# Patient Record
Sex: FEMALE | Race: BLACK OR AFRICAN AMERICAN | NOT HISPANIC OR LATINO | Employment: UNEMPLOYED | URBAN - METROPOLITAN AREA
[De-identification: names, ages, dates, MRNs, and addresses within clinical notes are randomized per-mention and may not be internally consistent; named-entity substitution may affect disease eponyms.]

---

## 2021-08-02 ENCOUNTER — TRANSFERRED RECORDS (OUTPATIENT)
Dept: MULTI SPECIALTY CLINIC | Facility: CLINIC | Age: 31
End: 2021-08-02

## 2021-08-02 LAB — PAP SMEAR - HIM PATIENT REPORTED: NEGATIVE

## 2022-04-29 ENCOUNTER — HOSPITAL ENCOUNTER (EMERGENCY)
Facility: CLINIC | Age: 32
Discharge: HOME | End: 2022-04-29
Payer: COMMERCIAL

## 2022-04-29 VITALS
SYSTOLIC BLOOD PRESSURE: 117 MMHG | HEART RATE: 70 BPM | WEIGHT: 178 LBS | OXYGEN SATURATION: 99 % | TEMPERATURE: 99.5 F | RESPIRATION RATE: 16 BRPM | DIASTOLIC BLOOD PRESSURE: 67 MMHG

## 2022-04-29 NOTE — ED TRIAGE NOTES
Patient took a plan B yesterday and is now having increased vaginal bleeding since two. She has gone through 4 pads and tampons today.

## 2022-12-28 ENCOUNTER — HOSPITAL ENCOUNTER (EMERGENCY)
Facility: CLINIC | Age: 32
Discharge: HOME | End: 2022-12-28
Attending: EMERGENCY MEDICINE | Admitting: EMERGENCY MEDICINE
Payer: COMMERCIAL

## 2022-12-28 VITALS
TEMPERATURE: 98 F | HEIGHT: 70 IN | HEART RATE: 78 BPM | BODY MASS INDEX: 27.35 KG/M2 | RESPIRATION RATE: 16 BRPM | DIASTOLIC BLOOD PRESSURE: 58 MMHG | OXYGEN SATURATION: 100 % | SYSTOLIC BLOOD PRESSURE: 124 MMHG | WEIGHT: 191 LBS

## 2022-12-28 DIAGNOSIS — O20.9 BLEEDING IN EARLY PREGNANCY: ICD-10-CM

## 2022-12-28 LAB
ABO/RH(D): NORMAL
HCG SERPL-ACNC: 1877 MLU/ML (ref 0–4)
HGB BLD-MCNC: 12.5 G/DL (ref 11.7–15.7)
HOLD SPECIMEN: NORMAL
SPECIMEN EXPIRATION DATE: NORMAL

## 2022-12-28 PROCEDURE — 86901 BLOOD TYPING SEROLOGIC RH(D): CPT | Performed by: EMERGENCY MEDICINE

## 2022-12-28 PROCEDURE — 36415 COLL VENOUS BLD VENIPUNCTURE: CPT | Performed by: EMERGENCY MEDICINE

## 2022-12-28 PROCEDURE — 84702 CHORIONIC GONADOTROPIN TEST: CPT | Performed by: EMERGENCY MEDICINE

## 2022-12-28 PROCEDURE — 99283 EMERGENCY DEPT VISIT LOW MDM: CPT

## 2022-12-28 PROCEDURE — 85018 HEMOGLOBIN: CPT | Performed by: EMERGENCY MEDICINE

## 2022-12-28 ASSESSMENT — ACTIVITIES OF DAILY LIVING (ADL): ADLS_ACUITY_SCORE: 33

## 2022-12-28 NOTE — ED TRIAGE NOTES
The patient presents to the ED with c/o vaginal bleeding since this morning. Had positive pregnancy test 2 days ago. LMP was last week in November. Denies any abdominal cramping or back pain.      Triage Assessment     Row Name 12/28/22 110       Triage Assessment (Adult)    Airway WDL WDL       Respiratory WDL    Respiratory WDL WDL       Skin Circulation/Temperature WDL    Skin Circulation/Temperature WDL WDL       Cardiac WDL    Cardiac WDL WDL       Peripheral/Neurovascular WDL    Peripheral Neurovascular WDL WDL       Cognitive/Neuro/Behavioral WDL    Cognitive/Neuro/Behavioral WDL WDL

## 2022-12-28 NOTE — DISCHARGE INSTRUCTIONS
Have a repeat quantitative beta-hCG performed on Friday.  It was 1877 today.  Return prior to that for for any pelvic pain or significant bleeding

## 2022-12-28 NOTE — ED PROVIDER NOTES
EMERGENCY DEPARTMENT ENCOUNTER      NAME: Khadijah Trimble  AGE: 32 year old female  YOB: 1990  MRN: [id number]  EVALUATION DATE & TIME: 12/28/2022 12:54 PM    PCP: No Ref-Primary, Physician    ED PROVIDER: Shin Austin M.D.      Chief Complaint   Patient presents with     Vaginal Bleeding - Pregnant         FINAL IMPRESSION:  Vaginal bleeding in early pregnancy      ED COURSE & MEDICAL DECISION MAKING:    Pertinent Labs & Imaging studies reviewed. (See chart for details)  32 year old female presents to the Emergency Department for evaluation of vaginal bleeding in setting of positive home pregnancy test.  Patient reports her last menstrual cycle was late November.  Has passed a couple of small clots.  No abdominal discomfort of any sort.  Patient does report prior pregnancies but reluctant to provide full history.  Blood work sent from triage area.  Quantitative hCG slightly more than 1800.  Blood type Rh+.  Hemoglobin normal.  Abdomen soft nontender.  Pelvis without tenderness.  Patient with early bleeding in pregnancy.  No signs of discomfort to suggest ectopic.  Routine return precautions given follow-up with repeat quantitative hCG in 2 days.. Patient appears non toxic with stable vitals signs. Overall exam is benign.          1:08 PM I met with the patient for the initial interview and physical examination. Discussed plan for treatment and workup in the ED.      At the conclusion of the encounter I discussed the results of all of the tests and the disposition. The questions were answered and return precautions provided. The patient or family acknowledged understanding and was agreeable with the care plan.       PPE: Provider wore gloves, N95 mask, eye protection, surgical cap.     MEDICATIONS GIVEN IN THE EMERGENCY:  Medications - No data to display    NEW PRESCRIPTIONS STARTED AT TODAY'S ER VISIT  New Prescriptions    No medications on file           =================================================================    HPI    Patient information was obtained from: Patient    Use of Intrepreter: N/A        Khadijah Trimble is a 32 year old female with no pertient medical history who presents to the ED for evaluation of vaginal bleeding. Patient states that she has passed a few small clots of blood. She recently had a positive pregnancy test at home with a positive test in the ED. Her last menstrual cycle was in late Novemeber (~1 month ago). Denies abdominal cramps. She did not state how many times she has been pregnant prior but noted that she has had pregnancy complication before. Denies history of tubal ligations. Patient just moved the [place] and does not have an OB in the area. Patient denies additional medical concerns or complaints at this time.       REVIEW OF SYSTEMS   Constitutional:  Denies fever, chills  Respiratory:  Denies productive cough or increased work of breathing  Cardiovascular:  Denies chest pain, palpitations  GI:  Denies abdominal pain (cramps), nausea, vomiting, or change in bowel or bladder habits. Endorses vaginal bleeding  Musculoskeletal:  Denies any new muscle/joint swelling  Skin:  Denies rash   Neurologic:  Denies focal weakness  All systems negative except as marked.     PAST MEDICAL HISTORY:  History reviewed. No pertinent past medical history.    PAST SURGICAL HISTORY:  History reviewed. No pertinent surgical history.      CURRENT MEDICATIONS:    No current facility-administered medications for this encounter.  No current outpatient medications on file.    ALLERGIES:  Allergies   Allergen Reactions     Bee Venom Anaphylaxis       FAMILY HISTORY:  History reviewed. No pertinent family history.    SOCIAL HISTORY:   Social History     Socioeconomic History     Marital status: Single       VITALS:  Patient Vitals for the past 24 hrs:   BP Temp Temp src Pulse Resp SpO2 Height Weight   12/28/22 1300 124/58 -- -- 78 -- 100 % --  "--   12/28/22 1105 109/56 98  F (36.7  C) Oral 70 16 99 % 1.778 m (5' 10\") 86.6 kg (191 lb)        PHYSICAL EXAM    Constitutional:  Awake, alert, in no apparent distress  HENT:  Normocephalic, Atraumatic. Bilateral external ears normal. Oropharynx moist. Nose normal. Neck- Normal range of motion   Eyes:  PERRL, EOMI with no signs of entrapment, Conjunctiva normal, No discharge.   GI:  Soft, No tenderness, No distension, No palpable masses  Musculoskeletal:   No edema. Good range of motion in all major joints.  Integument:  Warm, Dry, No erythema, No rash.   Neurologic:  Alert & oriented, Normal motor function, Normal sensory function, No focal deficits noted.   Psychiatric:  Affect normal, Judgment normal, Mood normal.     LAB:  All pertinent labs reviewed and interpreted.  Results for orders placed or performed during the hospital encounter of 12/28/22   HCG quantitative pregnancy (blood)   Result Value Ref Range    hCG Quantitative 1,877 (H) 0 - 4 mlU/mL   Result Value Ref Range    Hemoglobin 12.5 11.7 - 15.7 g/dL   ABO and Rh   Result Value Ref Range    ABO/RH(D) A POS     SPECIMEN EXPIRATION DATE [id number]        RADIOLOGY:  Reviewed all pertinent imaging. Please see official radiology report.  No orders to display         IIvy, am serving as a scribe to document services personally performed by Shin Austin MD, based on my observation and the provider's statements to me. I, Shin Austin MD attest that Ivy Kearney is acting in a scribe capacity, has observed my performance of the services and has documented them in accordance with my direction.    Shin Austin M.D.  Emergency Medicine  [facility] EMERGENCY ROOM     Shin Austin MD  12/29/22 4156    "

## 2023-05-06 ENCOUNTER — HOSPITAL ENCOUNTER (OUTPATIENT)
Facility: CLINIC | Age: 33
End: 2023-05-06
Admitting: ADVANCED PRACTICE MIDWIFE
Payer: COMMERCIAL

## 2023-05-06 ENCOUNTER — NURSE TRIAGE (OUTPATIENT)
Dept: NURSING | Facility: CLINIC | Age: 33
End: 2023-05-06
Payer: COMMERCIAL

## 2023-05-06 ENCOUNTER — HOSPITAL ENCOUNTER (OUTPATIENT)
Facility: CLINIC | Age: 33
Discharge: HOME | End: 2023-05-06
Attending: ADVANCED PRACTICE MIDWIFE | Admitting: ADVANCED PRACTICE MIDWIFE
Payer: COMMERCIAL

## 2023-05-06 ENCOUNTER — HOSPITAL ENCOUNTER (EMERGENCY)
Facility: CLINIC | Age: 33
Discharge: HOME | End: 2023-05-06
Attending: EMERGENCY MEDICINE | Admitting: EMERGENCY MEDICINE
Payer: COMMERCIAL

## 2023-05-06 VITALS
DIASTOLIC BLOOD PRESSURE: 54 MMHG | WEIGHT: 192 LBS | HEIGHT: 69 IN | HEART RATE: 62 BPM | RESPIRATION RATE: 18 BRPM | SYSTOLIC BLOOD PRESSURE: 114 MMHG | TEMPERATURE: 98.4 F | BODY MASS INDEX: 28.44 KG/M2

## 2023-05-06 VITALS
WEIGHT: 192 LBS | HEIGHT: 69 IN | BODY MASS INDEX: 28.44 KG/M2 | DIASTOLIC BLOOD PRESSURE: 52 MMHG | OXYGEN SATURATION: 99 % | RESPIRATION RATE: 18 BRPM | SYSTOLIC BLOOD PRESSURE: 109 MMHG | HEART RATE: 76 BPM | TEMPERATURE: 97.4 F

## 2023-05-06 DIAGNOSIS — O26.899 ABDOMINAL PAIN AFFECTING PREGNANCY: ICD-10-CM

## 2023-05-06 DIAGNOSIS — R10.9 ABDOMINAL PAIN AFFECTING PREGNANCY: ICD-10-CM

## 2023-05-06 DIAGNOSIS — K42.9 UMBILICAL HERNIA WITHOUT OBSTRUCTION OR GANGRENE: ICD-10-CM

## 2023-05-06 PROBLEM — Z36.89 ENCOUNTER FOR TRIAGE IN PREGNANT PATIENT: Status: ACTIVE | Noted: 2023-05-06

## 2023-05-06 LAB
ALBUMIN SERPL-MCNC: 3.1 G/DL (ref 3.5–5)
ALBUMIN UR-MCNC: NEGATIVE MG/DL
ALP SERPL-CCNC: 60 U/L (ref 45–120)
ALT SERPL W P-5'-P-CCNC: <9 U/L (ref 0–45)
ANION GAP SERPL CALCULATED.3IONS-SCNC: 7 MMOL/L (ref 5–18)
APPEARANCE UR: CLEAR
AST SERPL W P-5'-P-CCNC: 12 U/L (ref 0–40)
BASOPHILS # BLD AUTO: 0 10E3/UL (ref 0–0.2)
BASOPHILS NFR BLD AUTO: 0 %
BILIRUB SERPL-MCNC: 0.3 MG/DL (ref 0–1)
BILIRUB UR QL STRIP: NEGATIVE
BUN SERPL-MCNC: 5 MG/DL (ref 8–22)
CALCIUM SERPL-MCNC: 8.5 MG/DL (ref 8.5–10.5)
CHLORIDE BLD-SCNC: 107 MMOL/L (ref 98–107)
CO2 SERPL-SCNC: 23 MMOL/L (ref 22–31)
COLOR UR AUTO: ABNORMAL
CREAT SERPL-MCNC: 0.64 MG/DL (ref 0.6–1.1)
EOSINOPHIL # BLD AUTO: 0.3 10E3/UL (ref 0–0.7)
EOSINOPHIL NFR BLD AUTO: 4 %
ERYTHROCYTE [DISTWIDTH] IN BLOOD BY AUTOMATED COUNT: 13.2 % (ref 10–15)
GFR SERPL CREATININE-BSD FRML MDRD: >90 ML/MIN/1.73M2
GLUCOSE BLD-MCNC: 72 MG/DL (ref 70–125)
GLUCOSE UR STRIP-MCNC: NEGATIVE MG/DL
HCT VFR BLD AUTO: 33.2 % (ref 35–47)
HGB BLD-MCNC: 10.7 G/DL (ref 11.7–15.7)
HGB UR QL STRIP: NEGATIVE
IMM GRANULOCYTES # BLD: 0 10E3/UL
IMM GRANULOCYTES NFR BLD: 0 %
KETONES UR STRIP-MCNC: NEGATIVE MG/DL
LEUKOCYTE ESTERASE UR QL STRIP: ABNORMAL
LIPASE SERPL-CCNC: 43 U/L (ref 0–52)
LYMPHOCYTES # BLD AUTO: 1.9 10E3/UL (ref 0.8–5.3)
LYMPHOCYTES NFR BLD AUTO: 21 %
MCH RBC QN AUTO: 28.8 PG (ref 26.5–33)
MCHC RBC AUTO-ENTMCNC: 32.2 G/DL (ref 31.5–36.5)
MCV RBC AUTO: 90 FL (ref 78–100)
MONOCYTES # BLD AUTO: 0.8 10E3/UL (ref 0–1.3)
MONOCYTES NFR BLD AUTO: 9 %
MUCOUS THREADS #/AREA URNS LPF: PRESENT /LPF
NEUTROPHILS # BLD AUTO: 5.8 10E3/UL (ref 1.6–8.3)
NEUTROPHILS NFR BLD AUTO: 66 %
NITRATE UR QL: NEGATIVE
NRBC # BLD AUTO: 0 10E3/UL
NRBC BLD AUTO-RTO: 0 /100
PH UR STRIP: 7 [PH] (ref 5–7)
PLATELET # BLD AUTO: 269 10E3/UL (ref 150–450)
POTASSIUM BLD-SCNC: 3.7 MMOL/L (ref 3.5–5)
PROT SERPL-MCNC: 6.9 G/DL (ref 6–8)
RBC # BLD AUTO: 3.71 10E6/UL (ref 3.8–5.2)
RBC URINE: 2 /HPF
SODIUM SERPL-SCNC: 137 MMOL/L (ref 136–145)
SP GR UR STRIP: 1.02 (ref 1–1.03)
SQUAMOUS EPITHELIAL: 10 /HPF
TRANSITIONAL EPI: <1 /HPF
UROBILINOGEN UR STRIP-MCNC: 3 MG/DL
WBC # BLD AUTO: 8.9 10E3/UL (ref 4–11)
WBC URINE: 2 /HPF

## 2023-05-06 PROCEDURE — 81001 URINALYSIS AUTO W/SCOPE: CPT | Performed by: EMERGENCY MEDICINE

## 2023-05-06 PROCEDURE — 80053 COMPREHEN METABOLIC PANEL: CPT | Performed by: EMERGENCY MEDICINE

## 2023-05-06 PROCEDURE — G0463 HOSPITAL OUTPT CLINIC VISIT: HCPCS

## 2023-05-06 PROCEDURE — 36415 COLL VENOUS BLD VENIPUNCTURE: CPT | Performed by: EMERGENCY MEDICINE

## 2023-05-06 PROCEDURE — 85004 AUTOMATED DIFF WBC COUNT: CPT | Performed by: EMERGENCY MEDICINE

## 2023-05-06 PROCEDURE — 83690 ASSAY OF LIPASE: CPT | Performed by: EMERGENCY MEDICINE

## 2023-05-06 PROCEDURE — 99283 EMERGENCY DEPT VISIT LOW MDM: CPT

## 2023-05-06 RX ORDER — LIDOCAINE 40 MG/G
CREAM TOPICAL
Status: DISCONTINUED | OUTPATIENT
Start: 2023-05-06 | End: 2023-05-06 | Stop reason: HOSPADM

## 2023-05-06 ASSESSMENT — ACTIVITIES OF DAILY LIVING (ADL): ADLS_ACUITY_SCORE: 35

## 2023-05-06 NOTE — TELEPHONE ENCOUNTER
Pregnant 23 weeks.  Patient reporting umbilical hernia that is bulging, and is tender to touch, hard and pain moving around, difficult having BM.    Disposition is to ER.  Patient verbalizes understanding and agrees to plan.  She feels ok to drive herself.    Corrina Go RN  [facility]      Reason for Disposition    Hernia is painful or tender to touch    Additional Information    Negative: [1] Swelling of scrotum AND [2] has not previously been diagnosed with a hernia    Negative: SEVERE abdominal pain    Protocols used: HERNIA-A-AH

## 2023-05-07 NOTE — DISCHARGE INSTRUCTIONS
Discharge Instruction for Undelivered Patients      You were seen for:  umbilical pain  We Consulted: Joaquin Jackson CNM  You had (Test or Medicine):fetal monitoring     Diet:   Drink 8 to 12 glasses of liquids (milk, juice, water) every day.  You may eat meals and snacks.     Activity:  Count fetal kicks everyday (see handout)  Call your doctor or nurse midwife if your baby is moving less than usual.     Call your provider if you notice:  Swelling in your face or increased swelling in your hands or legs.  Headaches that are not relieved by Tylenol (acetaminophen).  Changes in your vision (blurring: seeing spots or stars.)  Nausea (sick to your stomach) and vomiting (throwing up).   Weight gain of 5 pounds or more per week.  Heartburn that doesn't go away.  Signs of bladder infection: pain when you urinate (use the toilet), need to go more often and more urgently.  The bag of rodriguez (rupture of membranes) breaks, or you notice leaking in your underwear.  Bright red blood in your underwear.  Abdominal (lower belly) or stomach pain.  For first baby: Contractions (tightening) less than 5 minutes apart for one hour or more.  Second (plus) baby: Contractions (tightening) less than 10 minutes apart and getting stronger.  *If less than 34 weeks: Contractions (tightening) more than 6 times in one hour.  Increase or change in vaginal discharge (note the color and amount)  Other: ***    Follow-up:  As scheduled in the clinic

## 2023-05-07 NOTE — DISCHARGE INSTRUCTIONS
You were seen in the emergency department for abdominal pain in mid pregnancy.  We think your evaluation is consistent with an umbilical hernia.  These can cause varying degrees of symptoms but right now we are able to easily reduce your is at the bedside.  The rest of your labs and urine testing today all looked good and we do not suspect any other kind of serious problem like kidney stone, gallbladder issue, appendicitis, etc.  It would be a good idea to get established with a general surgeon to discuss your options for managing this hernia.  Generally we like to avoid any kind of semielective surgery during pregnancy so this may need to wait until after delivery.  We would recommend Tylenol 650 mg every 6 hours for general pain relief.  If you have pain in this area we would recommend laying flat, you can apply ice packs and slow gentle pressure to see if you can reduce the hernia yourself.  If you have uncontrolled pain lasting multiple hours and unable to reduce it, we need to reevaluate in the emergency department right away.

## 2023-05-07 NOTE — PROGRESS NOTES
Data: Patient assessed in the [facility] for abdominal pain. Cervical exam deferred. Membranes intact. Contractions are not present. See flowsheets for fetal assessment documentation.     Action: Presumed adequate fetal oxygenation documented. Discharge instructions reviewed. Patient instructed to report change in fetal movement, vaginal leaking of fluid or bleeding, abdominal pain, or any concerns related to the pregnancy to provider/clinic.      Response: Orders to discharge home per Joaquin Jackson CNM and proceed to emergency department for further care. Patient verbalized understanding of education and agreement with plan. Discharged to home at 1950.

## 2023-05-07 NOTE — PROGRESS NOTES
Spoke with Joaquin Jackson CNM about pt arrival and status regarding pre-existing umbilical hernia with worsening pain. Unable to consistently trace fetal heart tones d/t to continuous fetal movement and early gestation. Okay per CNM to not obtain full 20 min strip. Vital signs WNL, no other concerning signs/symptoms at this time.     Per midwife- pt cleared from an OB standpoint and is okay to go to emergency department for further care.

## 2023-05-07 NOTE — ED PROVIDER NOTES
EMERGENCY DEPARTMENT ENCOUNTER      NAME: Khadijah Trimble  AGE: 32 year old female  YOB: 1990  MRN: [id number]  EVALUATION DATE & TIME: 2023  8:26 PM    PCP: No Ref-Primary, Physician    ED PROVIDER: Daniel James M.D.      Chief Complaint   Patient presents with     Abdominal Pain     Flank Pain         FINAL IMPRESSION:  1. Umbilical hernia without obstruction or gangrene    2. Abdominal pain affecting pregnancy          ED COURSE & MEDICAL DECISION MAKIN:11 PM I met with the patient, obtained history, performed an initial exam, and discussed options and plan for diagnostics and treatment here in the ED.   9:27 PM Patient ready for discharge.    32 year old female presents to the Emergency Department for evaluation of abdominal pain.  Patient is currently 23 weeks pregnant, has previously been known to have an abdominal umbilical hernia which was most pronounced during other previous pregnancies.  It is readily reducible on examination here and she currently reports that her pain is resolved having been more severe earlier.  It does seem to be radiating out from her abdominal hernia.  Labs were obtained as below including urine analysis with no evidence of infection or blood and labs including normal white blood cell count, stable hemoglobin, and unremarkable lipase and liver function tests.  I do not think her examination is consistent with anything like acute cholecystitis, appendicitis, or other pregnancy related complication.  She did have reassuring fetal monitoring just prior to presenting here to continue her evaluation.  With her hernia easily reducible, I think she can have a consult with a general surgeon but expect probably no immediate plans to proceed with a elective surgery while she is pregnant and this would probably have to wait until after delivery again.  We discussed strategies for manually reducing hernias at home as well as reviewing return precautions for any  severe sustained pain or nonreducible hernia.  Patient was discharged in stable condition.    At the conclusion of the encounter I discussed the results of all of the tests and the disposition. The questions were answered. The patient or family acknowledged understanding and was agreeable with the care plan.       Medical Decision Making    History:    Supplemental history from: Documented in chart, if applicable    External Record(s) reviewed: Documented in chart, if applicable.    Work Up:    Chart documentation includes differential considered and any EKGs or imaging independently interpreted by provider, where specified.    In additional to work up documented, I considered the following work up: Documented in chart, if applicable.    External consultation:    Discussion of management with another provider: Documented in chart, if applicable    Complicating factors:    Care impacted by chronic illness: N/A    Care affected by social determinants of health: N/A    Disposition considerations: Discharge. No recommendations on prescription strength medication(s). See documentation for any additional details.            MEDICATIONS GIVEN IN THE EMERGENCY:  Medications - No data to display    NEW PRESCRIPTIONS STARTED AT TODAY'S ER VISIT  There are no discharge medications for this patient.         =================================================================    HPI    Patient information was obtained from: Patient    Use of : N/A          Khadijah MARQUIS Atilio is a 32 year old female with a pertinent history of bleeding in early pregnancy who presents to this ED via walk-in with her  for evaluation of abdominal pain and flank pain.    Patient is 23 weeks pregnant. She reports she has a hernia above the umbilicus that is causing pain and radiates across to her right flank. This started 2-3 days ago. She states the hernia was regular, but now is feeling pressure from it. It was initially small, but her  "last provider told her she should consider surgery. Patient has not talked to surgery about it. Patient denies taking anything for the pain. Pain is worse when moving around during the day. Improved with laying down. Pain is currently a 3/10. No vaginal bleeding or vaginal discharge. Patient denies a nausea, vomiting, or any urinary symptoms. Patient denies any other current complaints.      REVIEW OF SYSTEMS   All systems reviewed and negative except as noted in HPI.    PAST MEDICAL HISTORY:  No past medical history on file.    PAST SURGICAL HISTORY:  No past surgical history on file.        CURRENT MEDICATIONS:    No current facility-administered medications for this encounter.     No current outpatient medications on file.         ALLERGIES:  Allergies   Allergen Reactions     Bee Venom Anaphylaxis       FAMILY HISTORY:  No family history on file.    SOCIAL HISTORY:   Social History     Socioeconomic History     Marital status: Single       VITALS:  /52   Pulse 76   Temp 97.4  F (36.3  C) (Temporal)   Resp 18   Ht 1.753 m (5' 9\")   Wt 87.1 kg (192 lb)   LMP 11/30/2022 (Within Days)   SpO2 99%   BMI 28.35 kg/m      PHYSICAL EXAM    Constitutional: Well developed, Well nourished, NAD.  HENT: Normocephalic, Atraumatic. Neck Supple.  Eyes: EOMI, Conjunctiva normal.  Respiratory: Breathing comfortably on room air. Speaks full sentences easily. Lungs clear to ascultation.  Cardiovascular: Normal heart rate, Regular rhythm. No peripheral edema.  Abdomen: Soft, gravid, nontender.  There is a umbilical hernia palpable which is easily reducible with no palpable bowel loops.  No erythema warmth or fluctuance.  Musculoskeletal: Good range of motion in all major joints. No major deformities noted.  Integument: Warm, Dry.  Neurologic: Alert & awake, Normal motor function, Normal sensory function, No focal deficits noted.   Psychiatric: Cooperative. Affect appropriate.     LAB:  All pertinent labs reviewed and " interpreted.  Labs Ordered and Resulted from Time of ED Arrival to Time of ED Departure   COMPREHENSIVE METABOLIC PANEL - Abnormal       Result Value    Sodium 137      Potassium 3.7      Chloride 107      Carbon Dioxide (CO2) 23      Anion Gap 7      Urea Nitrogen 5 (*)     Creatinine 0.64      Calcium 8.5      Glucose 72      Alkaline Phosphatase 60      AST 12      ALT <9      Protein Total 6.9      Albumin 3.1 (*)     Bilirubin Total 0.3      GFR Estimate >90     ROUTINE UA WITH MICROSCOPIC REFLEX TO CULTURE - Abnormal    Color Urine Light Yellow      Appearance Urine Clear      Glucose Urine Negative      Bilirubin Urine Negative      Ketones Urine Negative      Specific Gravity Urine 1.016      Blood Urine Negative      pH Urine 7.0      Protein Albumin Urine Negative      Urobilinogen Urine 3.0 (*)     Nitrite Urine Negative      Leukocyte Esterase Urine 25 Yonathan/uL (*)     Mucus Urine Present (*)     RBC Urine 2      WBC Urine 2      Squamous Epithelials Urine 10 (*)     Transitional Epithelials Urine <1     CBC WITH PLATELETS AND DIFFERENTIAL - Abnormal    WBC Count 8.9      RBC Count 3.71 (*)     Hemoglobin 10.7 (*)     Hematocrit 33.2 (*)     MCV 90      MCH 28.8      MCHC 32.2      RDW 13.2      Platelet Count 269      % Neutrophils 66      % Lymphocytes 21      % Monocytes 9      % Eosinophils 4      % Basophils 0      % Immature Granulocytes 0      NRBCs per 100 WBC 0      Absolute Neutrophils 5.8      Absolute Lymphocytes 1.9      Absolute Monocytes 0.8      Absolute Eosinophils 0.3      Absolute Basophils 0.0      Absolute Immature Granulocytes 0.0      Absolute NRBCs 0.0     LIPASE - Normal    Lipase 43           I, Juhi Aguayo, am serving as a scribe to document services personally performed by Dr. Daniel James, based on my observation and the provider's statements to me. I, Daniel James MD attest that Juhi Aguayo is acting in a scribe capacity, has observed my performance of the services and  has documented them in accordance with my direction.    Daniel James M.D.  Emergency Medicine  [facility] EMERGENCY ROOM  [address] [facility] [id number] [phone]  Dept: [phone]     Daniel James MD  05/07/23 0020

## 2023-05-07 NOTE — PROGRESS NOTES
Data: Patient presented to [facility]: 2023  7:14 PM.  Reason for maternal/fetal assessment is abdominal pain and umbilical hernia pain. Patient reports worsening pain related to pre-existing umbilical hernia, present before this pregnancy. Patient denies uterine contractions, leaking of vaginal fluid/rupture of membranes, vaginal bleeding, headache, visual disturbances, epigastric or RUQ pain, significant edema. Patient reports fetal movement is normal. Patient is a 23w2d .  Prenatal record reviewed. Pregnancy has been uncomplicated.    Vital signs wnl. Support person is present.     Action: Verbal consent for EFM. Triage assessment completed.     Response: Patient verbalized agreement with plan. Will contact Joaquin Jackson CNM with update and further orders.

## 2023-05-07 NOTE — ED TRIAGE NOTES
Pt complains of pain above umbilicus that radiates across the right side of the abdomen/flank. Pt is pregnant. Pain has been going on for a few days, no complications with bleeding. Pt was seen upstairs for OB for evaluation, and now present for evaluation in ED. Pain 3/10, pt has not taken any medication for discomfort.      Triage Assessment     Row Name 05/06/23 2001       Triage Assessment (Adult)    Airway WDL WDL       Respiratory WDL    Respiratory WDL WDL       Skin Circulation/Temperature WDL    Skin Circulation/Temperature WDL X       Cardiac WDL    Cardiac WDL WDL       Peripheral/Neurovascular WDL    Peripheral Neurovascular WDL WDL       Cognitive/Neuro/Behavioral WDL    Cognitive/Neuro/Behavioral WDL WDL

## 2023-06-27 ENCOUNTER — TRANSFERRED RECORDS (OUTPATIENT)
Dept: HEALTH INFORMATION MANAGEMENT | Facility: CLINIC | Age: 33
End: 2023-06-27
Payer: COMMERCIAL

## 2023-06-28 ENCOUNTER — MEDICAL CORRESPONDENCE (OUTPATIENT)
Dept: HEALTH INFORMATION MANAGEMENT | Facility: CLINIC | Age: 33
End: 2023-06-28
Payer: COMMERCIAL

## 2023-06-30 DIAGNOSIS — O99.019 ANEMIA COMPLICATING PREGNANCY: Primary | ICD-10-CM

## 2023-06-30 RX ORDER — METHYLPREDNISOLONE SODIUM SUCCINATE 125 MG/2ML
125 INJECTION, POWDER, LYOPHILIZED, FOR SOLUTION INTRAMUSCULAR; INTRAVENOUS
Status: CANCELLED
Start: 2023-07-03

## 2023-06-30 RX ORDER — ALBUTEROL SULFATE 90 UG/1
1-2 AEROSOL, METERED RESPIRATORY (INHALATION)
Status: CANCELLED
Start: 2023-07-03

## 2023-06-30 RX ORDER — HEPARIN SODIUM,PORCINE 10 UNIT/ML
5-20 VIAL (ML) INTRAVENOUS DAILY PRN
Status: CANCELLED | OUTPATIENT
Start: 2023-07-03

## 2023-06-30 RX ORDER — HEPARIN SODIUM (PORCINE) LOCK FLUSH IV SOLN 100 UNIT/ML 100 UNIT/ML
5 SOLUTION INTRAVENOUS
Status: CANCELLED | OUTPATIENT
Start: 2023-07-03

## 2023-06-30 RX ORDER — DIPHENHYDRAMINE HYDROCHLORIDE 50 MG/ML
50 INJECTION INTRAMUSCULAR; INTRAVENOUS
Status: CANCELLED
Start: 2023-07-03

## 2023-06-30 RX ORDER — MEPERIDINE HYDROCHLORIDE 25 MG/ML
25 INJECTION INTRAMUSCULAR; INTRAVENOUS; SUBCUTANEOUS EVERY 30 MIN PRN
Status: CANCELLED | OUTPATIENT
Start: 2023-07-03

## 2023-06-30 RX ORDER — ALBUTEROL SULFATE 0.83 MG/ML
2.5 SOLUTION RESPIRATORY (INHALATION)
Status: CANCELLED | OUTPATIENT
Start: 2023-07-03

## 2023-06-30 RX ORDER — EPINEPHRINE 1 MG/ML
0.3 INJECTION, SOLUTION, CONCENTRATE INTRAVENOUS EVERY 5 MIN PRN
Status: CANCELLED | OUTPATIENT
Start: 2023-07-03

## 2023-07-28 DIAGNOSIS — O99.019 ANEMIA AFFECTING PREGNANCY, ANTEPARTUM: Primary | ICD-10-CM

## 2023-08-05 ENCOUNTER — NURSE TRIAGE (OUTPATIENT)
Dept: NURSING | Facility: CLINIC | Age: 33
End: 2023-08-05
Payer: COMMERCIAL

## 2023-08-05 NOTE — TELEPHONE ENCOUNTER
"37 weeks pregnant, losing mucus plug. She will call her PCP to discuss next steps.  Mayte Nicolas RN  [facility]    Reason for Disposition   Possible incontinence of urine, BUT patient uncertain    Additional Information   Negative: [1] SEVERE abdominal pain (e.g., excruciating) AND [2] constant   Negative: Severe bleeding (e.g., continuous red blood from vagina, or large blood clots)   Negative: Umbilical cord hanging out of the vagina (shiny, white, curled appearance, \"like telephone cord\")   Negative: Can see baby   Negative: Uncontrollable urge to push (i.e., feels like baby is coming out now)   Negative: Sounds like a life-threatening emergency to the triager   Negative: < 20 weeks pregnant   Negative: Vaginal bleeding   Negative: Leakage of fluid from vagina   Negative: Baby moving less today (e.g., kick count < 5 in 1 hour or < 10 in 2 hours)    Protocols used: Pregnancy - Rupture of Membranes [id number]    "

## 2023-08-13 ENCOUNTER — HEALTH MAINTENANCE LETTER (OUTPATIENT)
Age: 33
End: 2023-08-13

## 2023-08-22 ENCOUNTER — TRANSFERRED RECORDS (OUTPATIENT)
Dept: HEALTH INFORMATION MANAGEMENT | Facility: CLINIC | Age: 33
End: 2023-08-22
Payer: COMMERCIAL

## 2023-08-24 PROBLEM — Z34.90 ENCOUNTER FOR ELECTIVE INDUCTION OF LABOR: Status: ACTIVE | Noted: 2023-08-24

## 2023-09-19 ENCOUNTER — MEDICAL CORRESPONDENCE (OUTPATIENT)
Dept: HEALTH INFORMATION MANAGEMENT | Facility: CLINIC | Age: 33
End: 2023-09-19
Payer: COMMERCIAL

## 2024-02-20 ENCOUNTER — MEDICAL CORRESPONDENCE (OUTPATIENT)
Dept: HEALTH INFORMATION MANAGEMENT | Facility: CLINIC | Age: 34
End: 2024-02-20
Payer: COMMERCIAL

## 2024-03-15 ENCOUNTER — TRANSFERRED RECORDS (OUTPATIENT)
Dept: HEALTH INFORMATION MANAGEMENT | Facility: CLINIC | Age: 34
End: 2024-03-15

## 2024-04-23 ENCOUNTER — MEDICAL CORRESPONDENCE (OUTPATIENT)
Dept: HEALTH INFORMATION MANAGEMENT | Facility: CLINIC | Age: 34
End: 2024-04-23

## 2024-04-23 ENCOUNTER — OFFICE VISIT (OUTPATIENT)
Dept: FAMILY MEDICINE | Facility: CLINIC | Age: 34
End: 2024-04-23
Payer: COMMERCIAL

## 2024-04-23 VITALS
OXYGEN SATURATION: 99 % | HEIGHT: 68 IN | DIASTOLIC BLOOD PRESSURE: 68 MMHG | SYSTOLIC BLOOD PRESSURE: 104 MMHG | HEART RATE: 72 BPM | BODY MASS INDEX: 29.83 KG/M2 | TEMPERATURE: 98 F | WEIGHT: 196.8 LBS

## 2024-04-23 DIAGNOSIS — Z30.9 ENCOUNTER FOR CONTRACEPTIVE MANAGEMENT, UNSPECIFIED TYPE: Primary | ICD-10-CM

## 2024-04-23 PROCEDURE — 99203 OFFICE O/P NEW LOW 30 MIN: CPT | Performed by: PHYSICIAN ASSISTANT

## 2024-04-23 SDOH — HEALTH STABILITY: PHYSICAL HEALTH: ON AVERAGE, HOW MANY MINUTES DO YOU ENGAGE IN EXERCISE AT THIS LEVEL?: 0 MIN

## 2024-04-23 SDOH — HEALTH STABILITY: PHYSICAL HEALTH: ON AVERAGE, HOW MANY DAYS PER WEEK DO YOU ENGAGE IN MODERATE TO STRENUOUS EXERCISE (LIKE A BRISK WALK)?: 0 DAYS

## 2024-04-23 ASSESSMENT — SOCIAL DETERMINANTS OF HEALTH (SDOH): HOW OFTEN DO YOU GET TOGETHER WITH FRIENDS OR RELATIVES?: ONCE A WEEK

## 2024-04-23 NOTE — COMMUNITY RESOURCES LIST (ENGLISH)
April 23, 2024           YOUR PERSONALIZED LIST OF SERVICES & PROGRAMS           & RECREATION    Sports      of [place] - Sports clubs and recreational activities  [address] [place] [address] (Distance: 5.0 miles)  Phone: [phone]  Website: https://www.[facility].KnightHaven/recreation-and-cabello/vstjcxfw-cgepwftjzl-ewexay/  Language: English  Fee: Self pay  Accessibility: Ada accessible      of [place] - Sports clubs and recreational activities  [address] [place] [address] (Distance: 4.3 miles)  Phone: [phone]  Website: [email or web address]-Trails  Language: English  Fee: Self pay      [employer]ASE[employer] SOFTBALL  Website: http://www.Playful DataleDaily Aisle.org    Classes/Groups      [facility] - Personal training  [address] [place] [address] (Distance: 6.8 miles)  Phone: [phone]  Website: https://www.Carbonite/  Language: English, Divehi  Fee: Sliding scale, Self pay, Insurance      of the [facility] fitness classes - [facility]  [address] [place] [address] (Distance: 0.9 miles)  Language: English  Fee: Free, Self pay, Sliding scale      [facility] - Care Coordination (Healthcare only)  Phone: [phone]  Website: https://[email or web address] HospitalSoftgate Systems.KnightHaven  Language: English, Divehi  Hours: Wed 9:00 AM - 11:30 AM Thu 1:00 PM - 4:00 PM, 5:30 PM - 7:00 PM               IMPORTANT NUMBERS & WEBSITES        Emergency Services  911  .   United Way  211 http://211unitedway.org  .   Poison Control  (999) 939-7527 http://mnpoison.org http://wisconsinpoison.org  .     Suicide and Crisis Lifeline  988 http://988lifeline.org  .   Childhelp National Child Abuse Hotline  987.444.5393 http://Childhelphotline.org   .   National Sexual Assault Hotline  (466) 400-1096 (HOPE) http://Rainn.org   .     National Runaway Safeline  (487) 274-6081 (RUNAWAY)  http://1800runaway.org  .   Pregnancy & Postpartum Support  Call/text 412-017-5600  [place]: http://ppsupportmn.org  [place]: http://psichapters.com/wi  .   Substance Abuse National Helpline ([facility])  673-837-HELP (7043) http://Findtreatment.gov   .                DISCLAIMER: These resources have been generated via the WiQuest Communications Platform. WiQuest Communications does not endorse any service providers mentioned in this resource list. WiQuest [employer] does not guarantee that the services mentioned in this resource list will be available to you or will improve your health or wellness.    [facility]

## 2024-04-23 NOTE — COMMUNITY RESOURCES LIST (ENGLISH)
April 23, 2024           YOUR PERSONALIZED LIST OF SERVICES & PROGRAMS           & RECREATION    Sports      of [place] - Sports clubs and recreational activities  [address] [place] [address] (Distance: 5.0 miles)  Phone: [phone]  Website: https://www.[facility].Leido Technology/recreation-and-cabello/dbivcabw-ktkjvvhxmb-oxorhx/  Language: English  Fee: Self pay  Accessibility: Ada accessible      of [place] - Sports clubs and recreational activities  [address] [place] [address] (Distance: 4.3 miles)  Phone: [phone]  Website: [email or web address]://[email or web address]/Cabello-Trails  Language: English  Fee: Self pay      [employer]ASE[employer]ALL [employer] SOFTBALL  Website: http://[email or web address].[email or web address]    Classes/Groups      [facility] - Personal training  [address] [place] [address] (Distance: 6.8 miles)  Phone: [phone]  Website: https://www.N3TWORK/  Language: English, Slovenian  Fee: Sliding scale, Self pay, Insurance      of the [facility] fitness classes - [facility]  [address] [place] [address] (Distance: 0.9 miles)  Language: English  Fee: Free, Self pay, Sliding scale      [facility] - Adult Enrichment  Phone: [phone]  Website: [email or web address]://Blucarat/adults-seniors/adult-enrichment/  Language: English  Hours: Mon 7:30 AM - 4:00 PM Tue 7:30 AM - 4:00 PM Wed 7:30 AM - 4:00 PM Thu 7:30 AM - 4:00 PM Fri 7:30 AM - 4:00 PM               IMPORTANT NUMBERS & WEBSITES        Emergency Services  911  .   United Way  211 http://211unitedway.org  .   Poison Control  (301) 258-1225 http://mnpoison.org http://wisconsinpoison.org  .     Suicide and Crisis Lifeline  988 http://988[facility] Hospitalline.org  .   Childhelp [place] Child Abuse Hotline  155.204.3458 http://Childhelphotline.org   .   National Sexual Assault Hotline  (927) 761-1007 (HOPE) http://[facility] Centern.org   .     National  Runaway Safeline  (739) 435-7749 (RUNAWAY) http://eshtery.org  .   Pregnancy & Postpartum Support  Call/text 056-196-4611  [place]: http://ppsupportmn.org  [place]: http://psichapters.com/wi  .   Substance Abuse National Helpline ([facility])  519-483-HELP (2776) http://Findtreatment.gov   .                DISCLAIMER: These resources have been generated via the eTobb Platform. eTobb does not endorse any service providers mentioned in this resource list. eTobb does not guarantee that the services mentioned in this resource list will be available to you or will improve your health or wellness.    [facility]

## 2024-04-23 NOTE — PROGRESS NOTES
"  Assessment & Plan     Encounter for contraceptive management, unspecified type  Discussed birth control options. Patient has tried and failed several options and has had side effects. Possible reaction to progesterone? Discussed options including trial of additional OCPs vs tubal ligation vs vasectomy. She'd like to meet with Ob/Gyn to discuss sterilization.  - Ob/Gyn  Referral; Future              BMI  Estimated body mass index is 29.65 kg/m  as calculated from the following:    Height as of this encounter: 1.735 m (5' 8.31\").    Weight as of this encounter: 89.3 kg (196 lb 12.8 oz).   Weight management plan: Discussed healthy diet and exercise guidelines    Counseling  Appropriate preventive services were discussed with this patient, including applicable screening as appropriate for fall prevention, nutrition, physical activity, Tobacco-use cessation, weight loss and cognition.  Checklist reviewing preventive services available has been given to the patient.  Reviewed patient's diet, addressing concerns and/or questions.           Corinne Lucio is a 33 year old, presenting for the following health issues:  Contraception (Possible allergic reaction [facility]. Would like to discuss different options)        4/23/2024    11:07 AM   Additional Questions   Roomed by An V.         4/23/2024    11:07 AM   Patient Reported Additional Medications   Patient reports taking the following new medications none     History of Present Illness       Reason for visit:  Check up    She eats 0-1 servings of fruits and vegetables daily.She consumes 1 sweetened beverage(s) daily.She exercises with enough effort to increase her heart rate 9 or less minutes per day.  She exercises with enough effort to increase her heart rate 3 or less days per week.   She is taking medications regularly.         Patient presents today to discuss birth control. [facility]. Over the course of the first week, she had worsening stomach pain, rashes around her ankles as well as thick dandruff in her scalp.   In the past she has had similar side effects with progesterone - tried depo, a few different OCPs.   Has a 9 yr old, 7 yr old and 8 mo old. Not wanting additional children.     had a medication . Was seen in the ED.               Review of Systems  Constitutional, HEENT, cardiovascular, pulmonary, gi and gu systems are negative, except as otherwise noted.      Objective    /68   Pulse 72   Temp 98  F (36.7  C) (Oral)   Ht 1.735 m (5' 8.31\")   Wt 89.3 kg (196 lb 12.8 oz)   SpO2 99%   BMI 29.65 kg/m    Body mass index is 29.65 kg/m .  Physical Exam   GENERAL: alert and no distress            Signed Electronically by: Gabriela Pérez PA-C    "

## 2024-04-23 NOTE — COMMUNITY RESOURCES LIST (ENGLISH)
April 23, 2024           YOUR PERSONALIZED LIST OF SERVICES & PROGRAMS           & RECREATION    Sports      of [place] - Sports clubs and recreational activities  [address] [place] [place] [address] (Distance: 5.0 miles)  Phone: [phone]  Website: https://www.[facility] HospitalWearYouWant.Terressentia/recreation-and-cabello/jjdemvud-ynijfscamd-jyosdk/  Language: English  Fee: Self pay  Accessibility: Ada accessible      [facility] Sports clubs and recreational activities  [address] [place] [address] (Distance: 4.3 miles)  Phone: [phone]  Website: [email or web address]://www.Domo Safety.[email or web address]/363/Cabello-Trails  Language: English  Fee: Self pay      [facility] - Adult Enrichment  Phone: [phone]  Website: [email or web address]://Invuity/adults-seniors/adult-enrichment/  Language: English  Hours: Mon 7:30 AM - 4:00 PM Tue 7:30 AM - 4:00 PM Wed 7:30 AM - 4:00 PM Thu 7:30 AM - 4:00 PM Fri 7:30 AM - 4:00 PM    Classes/Groups      [facility] - Personal training  [address] [place] [address] (Distance: 6.8 miles)  Phone: [phone]  Website: https://www.Volance/  Language: English, Sami  Fee: Sliding scale, Self pay, Insurance      of the [facility] fitness classes - [facility]  [address] [place] [address] (Distance: 0.9 miles)  Language: English  Fee: Free, Self pay, Sliding scale      [facility] - Care Coordination (Healthcare only)  Phone: [phone]  Website: [email or web address] [email or web address]  Language: English, Sami  Hours: Wed 9:00 AM - 11:30 AM Thu 1:00 PM - 4:00 PM, 5:30 PM - 7:00 PM               IMPORTANT NUMBERS & WEBSITES        Emergency Services  911  .   [facility]  211 http://211unitedway.org  .   Poison Control  (791) 350-3821 http://mnpoison.org http://wisconsinpoison.org  .     Suicide and Crisis Lifeline  988 http://988lifeline.org  .   Childhelp National Child  Abuse Hotline  901.297.4321 http://Childhelphotline.org   .   National Sexual Assault Hotline  (886) 206-9253 (HOPE) http://Rainn.org   .     National Runaway Safeline  (219) 412-6079 (RUNAWAY) http://PayAllies.Tengion  .   Pregnancy & Postpartum Support  Call/text 963-332-0691  [place]: http://ppsupportmn.org  [place]: http://psichapters.com/wi  .   Substance Abuse National Helpline ([facility])  030-087-HELP (3132) http://Findtreatment.gov   .                DISCLAIMER: These resources have been generated via the Masala Platform. Masala does not endorse any service providers mentioned in this resource list. Masala does not guarantee that the services mentioned in this resource list will be available to you or will improve your health or wellness.    [facility]

## 2024-04-23 NOTE — COMMUNITY RESOURCES LIST (ENGLISH)
April 23, 2024           YOUR PERSONALIZED LIST OF SERVICES & PROGRAMS           & RECREATION    Sports      of [place] - Sports clubs and recreational activities  [address] [place] [address] (Distance: 5.0 miles)  Phone: [phone]  Website: https://www.[facility].Open Mobile Solutions/recreation-and-cabello/khkzipuq-qrxmmsjzoe-dhojke/  Language: English  Fee: Self pay  Accessibility: Ada accessible      [facility] Sports clubs and recreational activities  [address] [place] [address] (Distance: 4.3 miles)  Phone: [phone]  Website: https://www.ConnectionPlus.ProviderTrust/363/Cabello-Trails  Language: English  Fee: Self pay      [facility] - Adult Enrichment  Phone: [phone]  Website: https://Mirakl/adults-seniors/adult-enrichment/  Language: English  Hours: Mon 7:30 AM - 4:00 PM Tue 7:30 AM - 4:00 PM Wed 7:30 AM - 4:00 PM Thu 7:30 AM - 4:00 PM Fri 7:30 AM - 4:00 PM    Classes/Groups      [facility] - Personal training  [address] [place] [address] (Distance: 6.8 miles)  Phone: [phone]  Website: https://www.Hermes IQ/  Language: English, Luxembourgish  Fee: Sliding scale, Self pay, Insurance      of the [facility] fitness classes - [facility]  [address] [place] [address] (Distance: 0.9 miles)  Language: English  Fee: Free, Self pay, Sliding scale      [facility] - Adult Enrichment  Phone: [phone]  Website: [email or web address]://Mirakl/adults-seniors/adult-enrichment/  Language: English  Hours: Mon 7:30 AM - 4:00 PM Tue 7:30 AM - 4:00 PM Wed 7:30 AM - 4:00 PM Thu 7:30 AM - 4:00 PM Fri 7:30 AM - 4:00 PM               IMPORTANT NUMBERS & WEBSITES        Emergency Services  911  .   [facility]  211 http://211unitedway.org  .   Poison Control  (635) 924-6744 http://mnpoison.org http://wisconsinpoison.org  .     Suicide and Crisis Lifeline  921  http://988lifeline.org  .   Childhelp [place] Child Abuse Hotline  701.638.9239 http://Childhelphotline.org   .   National Sexual Assault Hotline  (945) 673-3323 (HOPE) http://Rainn.org   .     National Runaway Safeline  (493) 478-1372 (RUNAWAY) http://Arno Therapeutics.oBaz  .   Pregnancy & Postpartum Support  Call/text 599-491-9345  [place]: http://ppsupportmn.org  [place]: http://psichapters.com/wi  .   Substance Abuse National Helpline ([facility])  214-028-HELP (6323) http://Findtreatment.gov   .                DISCLAIMER: These resources have been generated via the Flipps Platform. Flipps does not endorse any service providers mentioned in this resource list. Flipps does not guarantee that the services mentioned in this resource list will be available to you or will improve your health or wellness.    [facility]

## 2024-04-23 NOTE — COMMUNITY RESOURCES LIST (ENGLISH)
April 23, 2024           YOUR PERSONALIZED LIST OF SERVICES & PROGRAMS           & RECREATION    Sports      of [place] - Sports clubs and recreational activities  [address] [place] [address] (Distance: 5.0 miles)  Phone: [phone]  Website: https://www.[facility].Tower59/recreation-and-cabello/lkknckeb-jslyergfdq-uwmlev/  Language: English  Fee: Self pay  Accessibility: Ada accessible      [facility] Sports clubs and recreational activities  [address] [place] [address] (Distance: 4.3 miles)  Phone: [phone]  Website: [email or web address]://[email or web address]/Cabello-Trails  Language: English  Fee: Self pay      [facility] - Adult Enrichment  Phone: [phone]  Website: https://CrowdComfort/adults-seniors/adult-enrichment/  Language: English  Hours: Mon 7:30 AM - 4:00 PM Tue 7:30 AM - 4:00 PM Wed 7:30 AM - 4:00 PM Thu 7:30 AM - 4:00 PM Fri 7:30 AM - 4:00 PM    Classes/Groups      [facility] - Personal training  [address] [place] [address] (Distance: 6.8 miles)  Phone: [phone]  Website: https://www.Sensorflare PC/  Language: English, Telugu  Fee: Sliding scale, Self pay, Insurance      of the [facility] fitness classes - [facility]  [address] [place] [address] (Distance: 0.9 miles)  Language: English  Fee: Free, Self pay, Sliding scale      [facility] - Adult Enrichment  Phone: [phone]  Website: https://CrowdComfort/adults-seniors/adult-enrichment/  Language: English  Hours: Mon 7:30 AM - 4:00 PM Tue 7:30 AM - 4:00 PM Wed 7:30 AM - 4:00 PM Thu 7:30 AM - 4:00 PM Fri 7:30 AM - 4:00 PM               IMPORTANT NUMBERS & WEBSITES        Emergency Services  911  .   [facility]  211 http://211unitedway.org  .   Poison Control  (906) 818-8048 http://mnpoison.org http://wisconsinpoison.org  .     Suicide and Crisis Lifeline  625  http://988lifeline.org  .   Childhelp [place] Child Abuse Hotline  240.109.3347 http://Childhelphotline.org   .   National Sexual Assault Hotline  (982) 188-5628 (HOPE) http://Rainn.org   .     National Runaway Safeline  (944) 156-4988 (RUNAWAY) http://Colomob Network and Technology.InferX  .   Pregnancy & Postpartum Support  Call/text 717-706-7417  [place]: http://ppsupportmn.org  [place]: http://psichapters.com/wi  .   Substance Abuse National Helpline ([facility])  491-648-HELP (9139) http://Findtreatment.gov   .                DISCLAIMER: These resources have been generated via the AgenTec Platform. AgenTec does not endorse any service providers mentioned in this resource list. AgenTec does not guarantee that the services mentioned in this resource list will be available to you or will improve your health or wellness.    [facility]

## 2024-05-08 ENCOUNTER — NURSE TRIAGE (OUTPATIENT)
Dept: FAMILY MEDICINE | Facility: CLINIC | Age: 34
End: 2024-05-08
Payer: COMMERCIAL

## 2024-05-08 NOTE — TELEPHONE ENCOUNTER
Patient called to clinic to report having severe dizziness, started suddenly within the last half hour. Patient was in ER at [facility] over the weekend. Had a D&C done, was passing very large blood clots during menstrual cycle. Needed D&C to help remove the clots as she couldn't pass them on her own. History of anemia. History of needing blood transfusion. Was not transfused at ER over the weekend. After discharge, was advised to be on bedrest x 2 days and take Iron supplement. Did stay home from work on Monday and Tuesday but returned back to work today. 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 Has not been taking the iron supplement, hasn't had chance to  Rx from pharmacy as of yet.   Feeling very dizzy at work. Feels like she may pass out. Can't stand up for long, feels very faint and lightheaded when does. Needs to hold on to something to walk. Feels short of breath, feels like can't take deep breaths, has to stop talking during conversation to take a deep breath. Having very light vaginal bleeding today, was told by ER this would be expected after procedure she had. Feels very cold and chilled.   Had already called her SO to come and pick her up from work, prior to calling to clinic.  Patient notes she has been drinking a lot of water, had 1/2 cup of coffee this morning.    Given patient's current reported symptoms, concerns for significant anemia, advised patient to return to [facility] ER or nearest ER to her for further evaluation. SO to drive her, do not drive self. Notify coworkers not feeling well so can help monitor her until SO gets there. Call to 911 if severe shortness of breath, faints or does pass out, confused or slurred speech, numbness or tingling and one sided of the body weakness.     Patient voiced clear understanding and agreed with the recommendations. Will have SO take her to ER immediately, when he gets to her workplace to pick her up. Agreed to call 911 with any  "worsening symptoms/condition.      Tari Hillman RN  Clinical Triage/Primary Care  [facility]        Reason for Disposition   SEVERE dizziness (e.g., unable to stand, requires support to walk, feels like passing out now)    Additional Information   Negative: Chest pain   Negative: Rectal bleeding, bloody stool, or tarry-black stool   Negative: Vomiting is main symptom   Negative: Diarrhea is main symptom   Negative: Headache is main symptom   Negative: Heat exhaustion suspected (i.e., dehydration from heat exposure)   Negative: Patient states that they are having an anxiety or panic attack   Negative: Dizziness from low blood sugar (i.e., < 60 mg/dl or 3.5 mmol/l)   Negative: SEVERE difficulty breathing (e.g., struggling for each breath, speaks in single words)   Negative: Shock suspected (e.g., cold/pale/clammy skin, too weak to stand, low BP, rapid pulse)   Negative: Difficult to awaken or acting confused (e.g., disoriented, slurred speech)   Negative: Fainted, and still feels dizzy afterwards   Negative: Overdose (accidental or intentional) of medications   Negative: New neurologic deficit that is present now: * Weakness of the face, arm, or leg on one side of the body * Numbness of the face, arm, or leg on one side of the body * Loss of speech or garbled speech   Negative: Heart beating < 50 beats per minute OR > 140 beats per minute   Negative: Sounds like a life-threatening emergency to the triager    Answer Assessment - Initial Assessment Questions  1. DESCRIPTION: \"Describe your dizziness.\"      Feels more lightheaded and woozy, faint-like  2. LIGHTHEADED: \"Do you feel lightheaded?\" (e.g., somewhat faint, woozy, weak upon standing)      Worse when stands up, feels faint and may pass out if stands for too long  3. VERTIGO: \"Do you feel like either you or the room is spinning or tilting?\" (i.e. vertigo)      Not really vertigo like  4. SEVERITY: \"How bad is it?\"  \"Do you feel like " "you are going to faint?\" \"Can you stand and walk?\"    - MILD: Feels slightly dizzy, but walking normally.    - MODERATE: Feels unsteady when walking, but not falling; interferes with normal activities (e.g., school, work).    - SEVERE: Unable to walk without falling, or requires assistance to walk without falling; feels like passing out now.       Severe, has to sit down. Can't stand barely at all, feels very faint and weak  5. ONSET:  \"When did the dizziness begin?\"      Started suddenly about 15-20 minutes ago, but has had mild lightheaded feeling for last few days  6. AGGRAVATING FACTORS: \"Does anything make it worse?\" (e.g., standing, change in head position)      Standing.   7. HEART RATE: \"Can you tell me your heart rate?\" \"How many beats in 15 seconds?\"  (Note: not all patients can do this)        Seems normal  8. CAUSE: \"What do you think is causing the dizziness?\"      Not sure but recently had a procedure at [facility] over the weekend to help remove very large blood clots. Patient couldn't pass on her own. Had abnormal vaginal bleeding. Was discharged and was supposed to be taking Iron supplements but hasn't started them yet. Has a history of anemia and had needed a blood transfusion in the past.  9. RECURRENT SYMPTOM: \"Have you had dizziness before?\" If Yes, ask: \"When was the last time?\" \"What happened that time?\"      Yes, with abnormal vaginal bleeding history  10. OTHER SYMPTOMS: \"Do you have any other symptoms?\" (e.g., fever, chest pain, vomiting, diarrhea, bleeding)        Having some shortness of breath, has to pause during conversation to catch breath. Gets pain in chest intermittently.  11. PREGNANCY: \"Is there any chance you are pregnant?\" \"When was your last menstrual period?\"        No, LMP 5/1/24. Negative UPT in [facility] on 5/4.    Protocols used: Dizziness-A-OH    "

## 2024-05-13 ENCOUNTER — TELEPHONE (OUTPATIENT)
Dept: OBGYN | Facility: CLINIC | Age: 34
End: 2024-05-13

## 2024-05-13 ENCOUNTER — OFFICE VISIT (OUTPATIENT)
Dept: OBGYN | Facility: CLINIC | Age: 34
End: 2024-05-13
Attending: PHYSICIAN ASSISTANT
Payer: COMMERCIAL

## 2024-05-13 VITALS
BODY MASS INDEX: 29.78 KG/M2 | DIASTOLIC BLOOD PRESSURE: 66 MMHG | OXYGEN SATURATION: 100 % | WEIGHT: 197.6 LBS | SYSTOLIC BLOOD PRESSURE: 106 MMHG | HEART RATE: 71 BPM

## 2024-05-13 DIAGNOSIS — Z30.9 ENCOUNTER FOR CONTRACEPTIVE MANAGEMENT, UNSPECIFIED TYPE: ICD-10-CM

## 2024-05-13 DIAGNOSIS — Z30.09 ENCOUNTER FOR CONSULTATION FOR FEMALE STERILIZATION: Primary | ICD-10-CM

## 2024-05-13 DIAGNOSIS — D63.8 ANEMIA IN OTHER CHRONIC DISEASES CLASSIFIED ELSEWHERE: ICD-10-CM

## 2024-05-13 DIAGNOSIS — N93.9 ABNORMAL UTERINE BLEEDING: ICD-10-CM

## 2024-05-13 PROCEDURE — 99204 OFFICE O/P NEW MOD 45 MIN: CPT | Performed by: OBSTETRICS & GYNECOLOGY

## 2024-05-13 NOTE — TELEPHONE ENCOUNTER
[facility] SURGERY PLANNING/SCHEDULING WORKSHEET                                                     Khadijah Trimble                :  1990  MRN:  [id number]  Home Phone [phone]   Work Phone Not on file.   Mobile [phone]         Surgeon: Brent Diallo MD    DIAGNOSIS:   undesired fertility     SURGICAL PROCEDURE:  Laparoscopic bilateral sterilization     Surgery Location:  [facility]  Patient Surgery Class:  SDS  Length of Procedure:  45 minutes   Type of anesthesia:  General    Multi-surgeon case: no  OR Assistant needed:   No  Vendor needed: No  Positioning:  Lithotomy  Laterality:  Bilateral  Date requested:   when available     Special Equipment: Ligassure  Special Instructions for patient:  Per the [facility] Pre-Admission Nurse when they call the patient prior to the surgery date.   Precautions:  NONE  :  NOT NEEDED    Sterilization consent:  Yes and was signed on May 13, 2024.    Preop: Pre-op options: PCP  Pre-surgery consult needed:  pre surgical discussion, if patient feels she needs to discuss further   Postop evaluation needed:  2 weeks    ALLERGIES:   Allergies   Allergen Reactions    Bee Venom Anaphylaxis      BMI:There is no height or weight on file to calculate BMI.  30    The proposed surgical procedure is considered INTERMEDIATE risk.      Brent Diallo MD    2024  SURGERY SCHEDULING AND PRECERTIFICATION    Medical Record Number: [id number]  Khadijah Trimble  YOB: 1990   Phone: [phone] (home)   Primary Provider: Gabriela Pérez    Reason for Admit:  ICD-10 CODE:  Z30.2    Surgeon: Brent Diallo MD  Surgical Procedure: Laparoscopic bilateral sterilization    Date of Surgery  Time of Surgery 10:30am  Surgery to be performed at:  [facility]  Status: Outpatient  Type of Anesthesia Anticipated: General    Sterilization consent:  Yes and was signed on 24.    Pre-Op: On  with  Gabriela Pérez at [facility]  COVID testing:  Per Provider's discretion Covid testing is not indicated.     Post-Op:  2 weeks on 07/17 with Dr Diallo at [facility]    Pre-certification routed to Financial Counselors:  Yes    Surgery packet mailed to patient's home address: Yes  Patient instructed NPO 12 hours prior to surgery, arrive 1.5 hours  prior to surgery, must have a .  Patient understood and agrees to the plan.      Requestor:  Celestina Mcgraw     Location:  [facility] [phone]

## 2024-05-13 NOTE — PROGRESS NOTES
Khadijah is a 33 year old  referred here by GERALD Santana, with complaint of abnormal uterine bleeding and desiring contraception.    She has had abnormal uterine bleeding.  She has an  , with medication, through [facility].   She had uncontrollable bleeding with clots.  She had uncontrolled bleeding again in .  She had the ultrasound on 24 at [facility].    EXAM: US PELVIS COMPLETE TA   LOCATION: [facility]   DATE: 2024   INDICATION: Abnormal bleeding   COMPARISON: None.   TECHNIQUE: Transabdominal scans were performed.   FINDINGS:   UTERUS: 8.4 x 5.3 x 6.4 cm. Normal in size and position with no masses.   ENDOMETRIUM: 23 mm. A heterogeneously echogenic focus within the endometrial cavity measures 2.4 x 1.4 x 1.8 cm and there is internal Doppler flow. There is complex fluid within the vagina and an echogenic focus measuring 4.0 x 3.6 x 2.2 cm without definite internal Doppler vascularity.   RIGHT OVARY: 3.2 x 1.3 x 2.0 cm. Normal with flow demonstrated.   LEFT OVARY: 4.1 x 1.8 x 2.7 cm. Normal with flow demonstrated.   No significant free fluid.   IMPRESSION:   1. 2.4 cm echogenic focus in the endometrium with Doppler flow which could reflect an endometrial polyp, neoplasm, or submucosal fibroid. Recommend OB/GYN consultation and direct visualization.   2.  Complex fluid within the vagina, with a 4 cm echogenic focus without definite Doppler flow, possibly representing a clot. Again, direct visualization would be beneficial.       She had the D&C on 24 and the following results are noted, with POC obtained.    A) UTERINE CONTENTS, CURETTAGE:   1. Decidua, placental implantation site and sclerotic immature chorionic villi consistent      with intrauterine products of conception   2. Negative for somatic fetal tissue, grossly and microscopically   3. Negative for abnormal trophoblastic proliferation and malignancy   4. Background chronic and  focally acute endometritis         She had the D&C  on 05/05/24.  The bleeding resolved, but she had anemia symptoms.   She had shortness of breath, dizziness, fatigue  she did not have much bleeding afterwards, and now, not much bleeding at all.  Her hgb on 05/08/2024 was 6.9.      She reports she feels the bleeding is heavy, even when not pregnant.    The following labs are performed at [facility].    CBC WITH AUTO DIFFERENTIAL  Order: [id number]  Component  Ref Range & Units 5 d ago   WHITE BLOOD COUNT          4.5 - 11.0 thou/cu mm 7.6   RED BLOOD COUNT            4.00 - 5.20 mil/cu mm 2.79 Low    HEMOGLOBIN                12.0 - 16.0 g/dL 6.9 Low Panic    HEMATOCRIT                33.0 - 51.0 % 23.3 Low    MCV                        80 - 100 fL 84   MCH                        26.0 - 34.0 pg 24.7 Low    MCHC                      32.0 - 36.0 g/dL 29.6 Low    RDW                        11.5 - 15.5 % 15.6 High    PLATELET COUNT            140 - 440 thou/cu mm 312     Her hgb through [facility] has been the following:     Hemoglobin  11.7 - 15.7 g/dL 8.9 Low  10.7 Low  12.5     She does not feel that she has low hgb when not pregnant.      She also desires contraception. She reports she has used contraception in the past and she has had reactions to the pills, the depo provera and the patch.  She had the worse reaction with the Depo Provera.  She has had blotches of color changes with the products on her scalp, her arms and the legs.    The contraceptive options are reviewed and include but are not limited to the male and female sterilization procedures, barrier methods and spermicides. Hormonal methods were reviewed: Nexplanon, Mirena IUD (as well as the ParaGard IUD, although not hormonal), Injections, Rings, Oral contraceptives. Natural family planning also discussed.  We reviewed the R/B/A for abstinence, OCP, Patch, Nuva Ring, Nexplanon, Depo-Provera, IUD, condoms, and permanent sterilization. The potential side  effects of hormonal contraception including but not limited to thromboembolic events, hypertension, breakthrough bleeding, GI upset, and headaches.  Proper usage was also reviewed.   We also reviewed non-hormonal contraception and the goals and limitations.  Barrier methods were reviewed, and the need for the use of spermicides with the barrier methods.   We also reviewed the PHEXXI use and it is vaginal and may be placed immediately before intercourse and is good for 1 hour.  This is non hormonal and is composed of lactic acid, citric acid and potassium bitartrate.  Reviewed that only abstinence and condoms provide protection from STD's.   The ACOG pamphlets on Laparoscopy and sterilization procedures were given to the patient and reviewed with her. We reviewed the various tubal ligation procedures and the goals and limitations of each, and that the procedures are to be considered permanent and not reversible.   Pregnancy rates and ectopic pregnancy rates were discussed. The pregnancy rate of approximately 1:400 was reviewed with her and the rate might increase with time. However, this pregnancy risk is significantly lower when salpingectomy is performed for the sterilization.  In addition, the ectopic pregnancy rates vary depending upon the procedure performed, which varies between 25 - 50% of those individuals who do get pregnant.     Post procedure regret was also reviewed.   The ACOG pamphlets were given to the patient and reviewed with her.  Patient desires the tubal ligation for birth control.   We reviewed the risks and benefits and goals and limitations.   The risks include, but are not limited to, death, brain damage, paralysis of any or all limbs, loss of an organ, function of an organ, disfiguring scars, bleeding, infection, damage to the uterus, tubes, ovaries, bowel, bladder/urinary system, cervix and vagina.  In addition, there is a possibility of other procedures that might be deemed necessary at the  time of the surgery, depending upon findings and/or complications.  This may also include laparoscopy, laparotomy, and rarely colostomy (which often times can be reversible in the future).  Risks with blood include but are not limited to HIV/AIDS, hepatitis and transfusion reactions, with transfusion reactions being the greatest risk.   Questions seemed to be answered.       Past Medical History:   Diagnosis Date    History of blood transfusion     Not sure when       Past Surgical History:   Procedure Laterality Date    BIOPSY      Recebt test for HPV       OB History    Para Term  AB Living   7 4 4 0 3 4   SAB IAB Ectopic Multiple Live Births   1 2 0 0 4      # Outcome Date GA Lbr Ezio/2nd Weight Sex Type Anes PTL Lv   7 Term 23 39w0d 01:06 / 00:02 3.1 kg (6 lb 13.4 oz) M Vag-Spont Nitrous N SHELLEY      Name: ISA COELHO-KEVIN      Apgar1: 8  Apgar5: 9   6 IAB            5 Term     M Vag-Spont   SHELLEY   4 Term     M Vag-Spont   SHELLEY   3 SAB 2017              Birth Comments: D & C   2 IAB            1 Term     F Vag-Spont   SHELLEY      Birth Comments: Adoption       Gynecological History            Patient's last menstrual period was 2024 (exact date).         See above HPI.        Allergies   Allergen Reactions    Bee Venom Anaphylaxis       Current Outpatient Medications   Medication Sig Dispense Refill    acetaminophen (TYLENOL) 325 MG tablet Take 2 tablets (650 mg) by mouth every 4 hours as needed for mild pain or fever (greater than or equal to 38  C /100.4  F (oral) or 38.5  C/ 101.4  F (core).) (Patient not taking: Reported on 2024)      docusate sodium (COLACE) 100 MG capsule Take 1 capsule (100 mg) by mouth daily as needed for constipation (Patient not taking: Reported on 2024)      ferrous sulfate (FEROSUL) 325 (65 Fe) MG tablet Take 1 tablet (325 mg) by mouth daily (with breakfast) (Patient not taking: Reported on 2024) 60 tablet 0    ibuprofen  (ADVIL/MOTRIN) 800 MG tablet Take 1 tablet (800 mg) by mouth every 8 hours as needed for other (cramping) (Patient not taking: Reported on 4/23/2024)         Social History     Socioeconomic History    Marital status: Single     Spouse name: Not on file    Number of children: Not on file    Years of education: Not on file    Highest education level: Not on file   Occupational History    Not on file   Tobacco Use    Smoking status: Never    Smokeless tobacco: Never   Substance and Sexual Activity    Alcohol use: Yes    Drug use: Never    Sexual activity: Yes     Partners: Male     Birth control/protection: Pull-out method, None   Other Topics Concern    Parent/sibling w/ CABG, MI or angioplasty before 65F 55M? Yes     Comment: Mother   Social History Narrative    Not on file     Social Determinants of Health     Financial Resource Strain: Low Risk  (4/23/2024)    Financial Resource Strain     Within the past 12 months, have you or your family members you live with been unable to get utilities (heat, electricity) when it was really needed?: No   Food Insecurity: Low Risk  (4/23/2024)    Food Insecurity     Within the past 12 months, did you worry that your food would run out before you got money to buy more?: No     Within the past 12 months, did the food you bought just not last and you didn t have money to get more?: No   Transportation Needs: Low Risk  (4/23/2024)    Transportation Needs     Within the past 12 months, has lack of transportation kept you from medical appointments, getting your medicines, non-medical meetings or appointments, work, or from getting things that you need?: No   Physical Activity: Inactive (4/23/2024)    Exercise Vital Sign     Days of Exercise per Week: 0 days     Minutes of Exercise per Session: 0 min   Stress: No Stress Concern Present (4/23/2024)    [employer] - Occupational Stress Questionnaire     Feeling of Stress : Not at all   Social Connections:  Unknown (5/5/2024)    Received from [facility]    Social Connections     Frequency of Communication with Friends and Family: Not on file   Interpersonal Safety: Low Risk  (4/23/2024)    Interpersonal Safety     Do you feel physically and emotionally safe where you currently live?: Yes     Within the past 12 months, have you been hit, slapped, kicked or otherwise physically hurt by someone?: No     Within the past 12 months, have you been humiliated or emotionally abused in other ways by your partner or ex-partner?: No   Housing Stability: Low Risk  (4/23/2024)    Housing Stability     Do you have housing? : Yes     Are you worried about losing your housing?: No       Family History   Problem Relation Age of Onset    Hypertension Mother     Other Cancer Mother     Asthma Mother          Review of Systems:  10 point ROS of systems including Constitutional, Eyes, Respiratory, Cardiovascular, Gastroenterology, Genitourinary, Integumentary, Muscularskeletal, Psychiatric were all negative except for pertinent positives noted in my HPI and in the PMH.          EXAM:  /66 (BP Location: Right arm, Cuff Size: Adult Regular)   Pulse 71   Wt 89.6 kg (197 lb 9.6 oz)   LMP 05/06/2024 (Exact Date)   SpO2 100%   BMI 29.78 kg/m    Body mass index is 29.78 kg/m .  General Appearance:  healthy, alert, active, no distress  Skin:  Normal skin turgor  Neuro:  Alert, cranial nerves grossly intact  HEENT: NCAT  Neck:  No masses or lesions carotids are +2/4. No bruits heard  Lungs:  Good respiratory effort   Abdomen: Soft, nontender.  Normal bowel sounds.  No masses  Vulva: No external lesions, normal hair distribution, no adenopathy  BUS:  Normal, no masses noted  Urethral meatus:  No masses noted  Urethra:  No hypermobility  Vagina: Moist, pink, no abnormal discharge, well rugated, no lesions  Cervix: Smooth, pink, no visible lesions  Uterus: Normal size, anteverted, non-tender,  mobile  Ovaries: No mass, non-tender, mobile  Extremities:  No clubbing, cyanosis or edema.        ASSESSMENT:  Encounter for sterilization consult  Chronic anemia   Abnormal uterine bleeding         PLAN:  The chronic anemia seems to be related with pregnancies for her.  At her first ob visit in 12/2022, her hgb was 12.5.  At this time she desires to have the tubal sterilization and she desires to hold on the endometrial ablation, unless she has an issue with bleeding in the future.    She voices understanding of the planned procedure and the associated risks and benefits and goals and limitations.   The surgical request will be placed.       Total time preparing to see patient with reviewing prior encounter and labs, face to face time, coordinating care and documentation, on the same calendar date:  50 minutes.       Brent Diallo MD

## 2024-05-23 NOTE — TELEPHONE ENCOUNTER
PB DOS: 7/3/24 OP  Type of Procedure:  Laparoscopic bilateral sterilization   CPT Codes: 54158  ICD10 Codes: Z30.2  Surgeon/Ordering provider: Brent Diallo MD  Pre-cert/Authorization completed:  no PA required   Payer: [employer]  Spoke to PA list   Ref. # / Auth #   Valid Dates:     Please advise the patient to contact their insurance for coverage and benefits. Prior authorization is not a guarantee of payment. Payment is based on the patient's benefit plan documents at the time of service

## 2024-06-24 ENCOUNTER — OFFICE VISIT (OUTPATIENT)
Dept: FAMILY MEDICINE | Facility: CLINIC | Age: 34
End: 2024-06-24
Payer: COMMERCIAL

## 2024-06-24 VITALS
HEIGHT: 68 IN | TEMPERATURE: 98.7 F | BODY MASS INDEX: 30.71 KG/M2 | OXYGEN SATURATION: 98 % | WEIGHT: 202.6 LBS | RESPIRATION RATE: 12 BRPM | DIASTOLIC BLOOD PRESSURE: 71 MMHG | HEART RATE: 90 BPM | SYSTOLIC BLOOD PRESSURE: 112 MMHG

## 2024-06-24 DIAGNOSIS — D50.0 IRON DEFICIENCY ANEMIA DUE TO CHRONIC BLOOD LOSS: ICD-10-CM

## 2024-06-24 DIAGNOSIS — Z01.818 PREOP GENERAL PHYSICAL EXAM: Primary | ICD-10-CM

## 2024-06-24 DIAGNOSIS — Z30.2 ENCOUNTER FOR STERILIZATION: ICD-10-CM

## 2024-06-24 LAB
ERYTHROCYTE [DISTWIDTH] IN BLOOD BY AUTOMATED COUNT: 18.4 % (ref 10–15)
HCT VFR BLD AUTO: 30.2 % (ref 35–47)
HGB BLD-MCNC: 9 G/DL (ref 11.7–15.7)
MCH RBC QN AUTO: 22 PG (ref 26.5–33)
MCHC RBC AUTO-ENTMCNC: 29.8 G/DL (ref 31.5–36.5)
MCV RBC AUTO: 74 FL (ref 78–100)
PLATELET # BLD AUTO: 322 10E3/UL (ref 150–450)
RBC # BLD AUTO: 4.09 10E6/UL (ref 3.8–5.2)
WBC # BLD AUTO: 6.8 10E3/UL (ref 4–11)

## 2024-06-24 PROCEDURE — 85027 COMPLETE CBC AUTOMATED: CPT | Performed by: PHYSICIAN ASSISTANT

## 2024-06-24 PROCEDURE — 36415 COLL VENOUS BLD VENIPUNCTURE: CPT | Performed by: PHYSICIAN ASSISTANT

## 2024-06-24 PROCEDURE — 99214 OFFICE O/P EST MOD 30 MIN: CPT | Performed by: PHYSICIAN ASSISTANT

## 2024-06-24 PROCEDURE — G2211 COMPLEX E/M VISIT ADD ON: HCPCS | Performed by: PHYSICIAN ASSISTANT

## 2024-06-24 RX ORDER — METHYLPREDNISOLONE SODIUM SUCCINATE 125 MG/2ML
125 INJECTION, POWDER, LYOPHILIZED, FOR SOLUTION INTRAMUSCULAR; INTRAVENOUS
Status: CANCELLED
Start: 2024-06-25

## 2024-06-24 RX ORDER — EPINEPHRINE 1 MG/ML
0.3 INJECTION, SOLUTION, CONCENTRATE INTRAVENOUS EVERY 5 MIN PRN
Status: CANCELLED | OUTPATIENT
Start: 2024-06-25

## 2024-06-24 RX ORDER — ALBUTEROL SULFATE 0.83 MG/ML
2.5 SOLUTION RESPIRATORY (INHALATION)
Status: CANCELLED | OUTPATIENT
Start: 2024-06-25

## 2024-06-24 RX ORDER — MEPERIDINE HYDROCHLORIDE 25 MG/ML
25 INJECTION INTRAMUSCULAR; INTRAVENOUS; SUBCUTANEOUS EVERY 30 MIN PRN
Status: CANCELLED | OUTPATIENT
Start: 2024-06-25

## 2024-06-24 RX ORDER — ALBUTEROL SULFATE 90 UG/1
1-2 AEROSOL, METERED RESPIRATORY (INHALATION)
Status: CANCELLED
Start: 2024-06-25

## 2024-06-24 RX ORDER — HEPARIN SODIUM (PORCINE) LOCK FLUSH IV SOLN 100 UNIT/ML 100 UNIT/ML
5 SOLUTION INTRAVENOUS
Status: CANCELLED | OUTPATIENT
Start: 2024-06-25

## 2024-06-24 RX ORDER — DIPHENHYDRAMINE HYDROCHLORIDE 50 MG/ML
50 INJECTION INTRAMUSCULAR; INTRAVENOUS
Status: CANCELLED
Start: 2024-06-25

## 2024-06-24 RX ORDER — HEPARIN SODIUM,PORCINE 10 UNIT/ML
5-20 VIAL (ML) INTRAVENOUS DAILY PRN
Status: CANCELLED | OUTPATIENT
Start: 2024-06-25

## 2024-06-24 NOTE — PROGRESS NOTES
[facility]  [facility]  [address]  [place] [address]  Phone: [phone]  Primary Provider: Gabriela Pérez PA-C  Pre-op Performing Provider: Gabriela Pérez PA-C  Jun 24, 2024 6/23/2024   Surgical Information   What procedure is being done? Laparoscopic Bilateral Sterilization    Facility or Hospital where procedure/surgery will be performed: [facility]    Who is doing the procedure / surgery? Brent francis   Date of surgery / procedure: 07/03/2024   Time of surgery / procedure: Unsure   Where do you plan to recover after surgery? at home with family        Fax number for surgical facility: [phone]    Assessment & Plan     The proposed surgical procedure is considered INTERMEDIATE risk.    Preop general physical exam  Encounter for sterilization      Iron deficiency anemia due to chronic blood loss  Hemoglobin 9.0 today, will order iron transfusion to be completed this week. Recheck hemoglobin next Monday.   - CBC with platelets; Future  - CBC with platelets  - CBC with platelets; Future            - No identified additional risk factors other than previously addressed    Antiplatelet or Anticoagulation Medication Instructions   - Patient is on no antiplatelet or anticoagulation medications.    Additional Medication Instructions  Patient is on no additional chronic medications    Recommendation  Suggest patient have iron transfusion this week then recheck hemoglobin on Monday prior to Wednesday surgery.   IF HEMOGLOBIN INCREASES, Approval given to proceed with proposed procedure.      The longitudinal plan of care for the diagnosis(es)/condition(s) as documented were addressed during this visit. Due to the added complexity in care, I will continue to support Khadijah in the subsequent management and with ongoing continuity of care.      Corinne Lucio is a 33 year old, presenting for the following:  Pre-Op Exam           2024     2:11 PM   Additional Questions   Roomed by An V         2024     2:11 PM   Patient Reported Additional Medications   Patient reports taking the following new medications none     HPI related to upcoming procedure: Patient wanting tubal ligation.    Recent  and bleeding. Last hemoglobin 24 was 6.9. Not taking iron supplement.         2024   Pre-Op Questionnaire   Have you ever had a heart attack or stroke? No   Have you ever had surgery on your heart or blood vessels, such as a stent placement, a coronary artery bypass, or surgery on an artery in your head, neck, heart, or legs? No   Do you have chest pain with activity? No   Do you have a history of heart failure? No   Do you currently have a cold, bronchitis or symptoms of other infection? No   Do you have a cough, shortness of breath, or wheezing? No   Do you or anyone in your family have previous history of blood clots? (!) YES    Do you or does anyone in your family have a serious bleeding problem such as prolonged bleeding following surgeries or cuts? (!) YES prolonged bleeding after  2024   Have you ever had problems with anemia or been told to take iron pills? (!) YES recently prescribed iron for anemia 2024   Have you had any abnormal blood loss such as black, tarry or bloody stools, or abnormal vaginal bleeding? (!) YES as above   Have you ever had a blood transfusion? (!) YES  Had iron transfusion 2023   Have you ever had a transfusion reaction? No   Are you willing to have a blood transfusion if it is medically needed before, during, or after your surgery? Yes   Have you or any of your relatives ever had problems with anesthesia? No   Do you have sleep apnea, excessive snoring or daytime drowsiness? No   Do you have any artifical heart valves or other implanted medical devices like a pacemaker, defibrillator, or continuous glucose monitor? No   Do you have artificial joints? No   Are you allergic to  latex? No        Health Care Directive  Patient does not have a Health Care Directive or Living Will: Discussed advance care planning with patient; information given to patient to review.    Preoperative Review of    reviewed - no record of controlled substances prescribed.          Patient Active Problem List    Diagnosis Date Noted    Encounter for elective induction of labor 08/24/2023     Priority: Medium    Anemia complicating pregnancy 06/30/2023     Priority: Medium    Encounter for triage in pregnant patient 05/06/2023     Priority: Medium    Bleeding in early pregnancy 12/28/2022     Priority: Medium      Past Medical History:   Diagnosis Date    History of blood transfusion     Not sure when     Past Surgical History:   Procedure Laterality Date    BIOPSY      Recebt test for HPV     Current Outpatient Medications   Medication Sig Dispense Refill    acetaminophen (TYLENOL) 325 MG tablet Take 2 tablets (650 mg) by mouth every 4 hours as needed for mild pain or fever (greater than or equal to 38  C /100.4  F (oral) or 38.5  C/ 101.4  F (core).) (Patient not taking: Reported on 4/23/2024)      docusate sodium (COLACE) 100 MG capsule Take 1 capsule (100 mg) by mouth daily as needed for constipation (Patient not taking: Reported on 4/23/2024)      ferrous sulfate (FEROSUL) 325 (65 Fe) MG tablet Take 1 tablet (325 mg) by mouth daily (with breakfast) (Patient not taking: Reported on 4/23/2024) 60 tablet 0    ibuprofen (ADVIL/MOTRIN) 800 MG tablet Take 1 tablet (800 mg) by mouth every 8 hours as needed for other (cramping) (Patient not taking: Reported on 4/23/2024)         Allergies   Allergen Reactions    Bee Venom Anaphylaxis        Social History     Tobacco Use    Smoking status: Never    Smokeless tobacco: Never   Substance Use Topics    Alcohol use: Yes       History   Drug Use Unknown             Review of Systems  Constitutional, neuro, ENT, endocrine, pulmonary, cardiac, gastrointestinal,  "genitourinary, musculoskeletal, integument and psychiatric systems are negative, except as otherwise noted.    Objective    /71   Pulse 90   Temp 98.7  F (37.1  C) (Oral)   Resp 12   Ht 1.735 m (5' 8.31\")   Wt 91.9 kg (202 lb 9.6 oz)   LMP 06/03/2024 (Approximate)   SpO2 98%   BMI 30.53 kg/m     Estimated body mass index is 30.53 kg/m  as calculated from the following:    Height as of this encounter: 1.735 m (5' 8.31\").    Weight as of this encounter: 91.9 kg (202 lb 9.6 oz).  Physical Exam  GENERAL: alert and no distress  EYES: Eyes grossly normal to inspection, PERRL and conjunctivae and sclerae normal  HENT: ear canals and TM's normal, nose and mouth without ulcers or lesions  NECK: no adenopathy, no asymmetry, masses, or scars  RESP: lungs clear to auscultation - no rales, rhonchi or wheezes  CV: regular rate and rhythm, normal S1 S2, no S3 or S4, no murmur, click or rub, no peripheral edema  ABDOMEN: soft, nontender, no hepatosplenomegaly, no masses and bowel sounds normal  MS: no gross musculoskeletal defects noted, no edema  SKIN: no suspicious lesions or rashes  NEURO: Normal strength and tone, mentation intact and speech normal  PSYCH: mentation appears normal, affect normal/bright    Recent Labs   Lab Test 08/25/23  0730 08/24/23  0827   HGB 7.8* 8.9*        Diagnostics  Recent Results (from the past 24 hour(s))   CBC with platelets    Collection Time: 06/24/24  2:46 PM   Result Value Ref Range    WBC Count 6.8 4.0 - 11.0 10e3/uL    RBC Count 4.09 3.80 - 5.20 10e6/uL    Hemoglobin 9.0 (L) 11.7 - 15.7 g/dL    Hematocrit 30.2 (L) 35.0 - 47.0 %    MCV 74 (L) 78 - 100 fL    MCH 22.0 (L) 26.5 - 33.0 pg    MCHC 29.8 (L) 31.5 - 36.5 g/dL    RDW 18.4 (H) 10.0 - 15.0 %    Platelet Count 322 150 - 450 10e3/uL      No EKG required, no history of coronary heart disease, significant arrhythmia, peripheral arterial disease or other structural heart disease.    Revised Cardiac Risk Index (RCRI)  The patient " has the following serious cardiovascular risks for perioperative complications:   - No serious cardiac risks = 0 points     RCRI Interpretation: 0 points: Class I (very low risk - 0.4% complication rate)         Signed Electronically by: Gabriela Pérez PA-C  Copy of this evaluation report is provided to requesting physician.

## 2024-06-24 NOTE — PATIENT INSTRUCTIONS
Patient Education   Preparing for Your Surgery  Getting started  A nurse will call you to review your health history and instructions. They will give you an arrival time based on your scheduled surgery time. Please be ready to share:  Your doctor's clinic name and phone number  Your medical, surgical, and anesthesia history  A list of allergies and sensitivities  A list of medicines, including herbal treatments and over-the-counter drugs  Whether the patient has a legal guardian (ask how to send us the papers in advance)  Please tell us if you're pregnant--or if there's any chance you might be pregnant. Some surgeries may injure a fetus (unborn baby), so they require a pregnancy test. Surgeries that are safe for a fetus don't always need a test, and you can choose whether to have one.   If you have a child who's having surgery, please ask for a copy of Preparing for Your Child's Surgery.    Preparing for surgery  Within 10 to 30 days of surgery: Have a pre-op exam (sometimes called an H&P, or History and Physical). This can be done at a clinic or pre-operative center.  If you're having a , you may not need this exam. Talk to your care team.  At your pre-op exam, talk to your care team about all medicines you take. If you need to stop any medicines before surgery, ask when to start taking them again.  We do this for your safety. Many medicines can make you bleed too much during surgery. Some change how well surgery (anesthesia) drugs work.  Call your insurance company to let them know you're having surgery. (If you don't have insurance, call [phone].)  Call your clinic if there's any change in your health. This includes signs of a cold or flu (sore throat, runny nose, cough, rash, fever). It also includes a scrape or scratch near the surgery site.  If you have questions on the day of surgery, call your hospital or surgery center.  Eating and drinking guidelines  For your safety: Unless your surgeon  tells you otherwise, follow the guidelines below.  Eat and drink as usual until 8 hours before you arrive for surgery. After that, no food or milk.  Drink clear liquids until 2 hours before you arrive. These are liquids you can see through, like water, Gatorade, and Propel Water. They also include plain black coffee and tea (no cream or milk), candy, and breath mints. You can spit out gum when you arrive.  If you drink alcohol: Stop drinking it the night before surgery.  If your care team tells you to take medicine on the morning of surgery, it's okay to take it with a sip of water.  Preventing infection  Shower or bathe the night before and morning of your surgery. Follow the instructions your clinic gave you. (If no instructions, use regular soap.)  Don't shave or clip hair near your surgery site. We'll remove the hair if needed.  Don't smoke or vape the morning of surgery. You may chew nicotine gum up to 2 hours before surgery. A nicotine patch is okay.  Note: Some surgeries require you to completely quit smoking and nicotine. Check with your surgeon.  Your care team will make every effort to keep you safe from infection. We will:  Clean our hands often with soap and water (or an alcohol-based hand rub).  Clean the skin at your surgery site with a special soap that kills germs.  Give you a special gown to keep you warm. (Cold raises the risk of infection.)  Wear special hair covers, masks, gowns and gloves during surgery.  Give antibiotic medicine, if prescribed. Not all surgeries need antibiotics.  What to bring on the day of surgery  Photo ID and insurance card  Copy of your health care directive, if you have one  Glasses and hearing aids (bring cases)  You can't wear contacts during surgery  Inhaler and eye drops, if you use them (tell us about these when you arrive)  CPAP machine or breathing device, if you use them  A few personal items, if spending the night  If you have . . .  A pacemaker, ICD (cardiac  defibrillator) or other implant: Bring the ID card.  An implanted stimulator: Bring the remote control.  A legal guardian: Bring a copy of the certified (court-stamped) guardianship papers.  Please remove any jewelry, including body piercings. Leave jewelry and other valuables at home.  If you're going home the day of surgery  You must have a responsible adult drive you home. They should stay with you overnight as well.  If you don't have someone to stay with you, and you aren't safe to go home alone, we may keep you overnight. Insurance often won't pay for this.  After surgery  If it's hard to control your pain or you need more pain medicine, please call your surgeon's office.  Questions?   If you have any questions for your care team, list them here: _________________________________________________________________________________________________________________________________________________________________________ ____________________________________ ____________________________________ ____________________________________  For informational purposes only. Not to replace the advice of your health care provider. Copyright   2003, 2019 [facility]. All rights reserved. Clinically reviewed by Marcela Valdez MD. [employer] 424892 - REV 12/22.

## 2024-06-26 ENCOUNTER — MYC MEDICAL ADVICE (OUTPATIENT)
Dept: FAMILY MEDICINE | Facility: CLINIC | Age: 34
End: 2024-06-26
Payer: COMMERCIAL

## 2024-06-26 DIAGNOSIS — O99.013 ANEMIA AFFECTING PREGNANCY IN THIRD TRIMESTER: ICD-10-CM

## 2024-06-27 NOTE — TELEPHONE ENCOUNTER
"Received call from patient. She is calling regarding iron infusion appointment. She is unable to have appointment with [facility] prior to her upcoming surgery on 7/3 for laparoscopic bilateral sterilization. Patient was put on a waitlist for [facility] due to this.    Noted from OV from 6/24:  Iron deficiency anemia due to chronic blood loss  Hemoglobin 9.0 today, will order iron transfusion to be completed this week. Recheck hemoglobin next Monday.     Advised to patient that another option is to contact central scheduling to look into other locations to find sooner availability. Central scheduler had provided the following locations of [place], [place], [place], [place], [facility], [place], and [place]. Informed patient. RN offered to assist with finding phone numbers to different locations otherwise she may call central scheduling and request to be transferred.    Patient expressed that she is overwhelmed by process to schedule iron infusions, and states \"I do not have time to call around and see who can get me in before my surgery, and I don't want to stress before my procedure\".    Patient requesting any further options or recommendations from PCP regarding hemoglobin levels. Advised to patient that most likely patient needs to have iron infusions to be able to have surgery due to HGB levels, and strongly recommend to call different locations to find sooner availability. Patient declined and requesting message to be sent to PCP for further recommendations.    Please advise.    FIDELIA Gorman RN  [facility]  "

## 2024-06-28 DIAGNOSIS — O99.013 ANEMIA AFFECTING PREGNANCY IN THIRD TRIMESTER: ICD-10-CM

## 2024-06-28 RX ORDER — FERROUS SULFATE 325(65) MG
325 TABLET ORAL
Qty: 90 TABLET | OUTPATIENT
Start: 2024-06-28

## 2024-06-28 RX ORDER — FERROUS SULFATE 325(65) MG
325 TABLET ORAL
Qty: 60 TABLET | Refills: 0 | Status: SHIPPED | OUTPATIENT
Start: 2024-06-28 | End: 2024-08-08

## 2024-06-28 NOTE — TELEPHONE ENCOUNTER
Gabriela,    Spoke with patient and notified. Per pt she is not on an iron supplement and asked if we could send one to our pharmacy. I told her insurance may not cover as this is something she can purchased OTC. Understood. Can we try sending script for patient?    Thanks,  YEMI Rodriguez  [facility]

## 2024-06-28 NOTE — TELEPHONE ENCOUNTER
Sent [employer] message to patient and called and relayed message from Gabriela Pérez PA-C.    Pt is wondering, if she is able to appt for Monday with infusion center, is this too close to scheduled surgery date? If she is able to gets infusion on 7/1, how long does she need to wait before checking CBC? Not sure if results would be back in time before surgery date of 7/3.    Pt understands that surgery need to be postponed and states that if this is the case, she will let Dr. Diallo know.  She is on the wait list for 2 infusion center locations currently. She states that she was offered an appt with infusion center today, and she declined it because she had to pick her kids up.    Teagan Dowd RN

## 2024-06-28 NOTE — TELEPHONE ENCOUNTER
We can try to recheck on Tuesday morning if she has it on Monday.   She should be taking an oral iron supplement at this time as well. Increasing iron rich foods.     I did send a message to Dr Diallo to update him on this as well.      Gabriela Pérez PA-C

## 2024-06-28 NOTE — TELEPHONE ENCOUNTER
Unfortunately need to have a higher hemoglobin going into this elective surgery. Will need to postpone if not able to have this completed prior to Wed 7/3.    Gabriela Pérez PA-C

## 2024-07-01 NOTE — TELEPHONE ENCOUNTER
Patients surgery was cancelled and rescheduled to 08/21 @ 9:00am [facility] due to needing to have iron infusions prior.  Surgery calendar updated as well as pre & post op appointments.

## 2024-07-05 ENCOUNTER — PATIENT OUTREACH (OUTPATIENT)
Dept: CARE COORDINATION | Facility: CLINIC | Age: 34
End: 2024-07-05
Payer: COMMERCIAL

## 2024-07-09 ENCOUNTER — TELEPHONE (OUTPATIENT)
Dept: FAMILY MEDICINE | Facility: CLINIC | Age: 34
End: 2024-07-09
Payer: COMMERCIAL

## 2024-07-09 NOTE — TELEPHONE ENCOUNTER
This RN called patient to offer injectafer infusion for tomorrow 7/10/24.  Patient states she feels this is too far away from her surgery time as surgery been rescheduled for August.  Writer explained we understood it was to be done as soon as we could get it scheduledand there are 2 doses.   Also explained we are booked out to second week in August.  Patient feels her body will use the iron too quickly and it will be too low then at surgery.  This RN asked patient to clarify with her provider and return a call to us when she wants to schedule, she will be taken off of the waitlist at this point.  She then asked to be scheduled for August.  Message sent to our schedulers to call her tomorrow to schedule.      Natalia Harris RN

## 2024-08-08 ENCOUNTER — INFUSION THERAPY VISIT (OUTPATIENT)
Dept: INFUSION THERAPY | Facility: CLINIC | Age: 34
End: 2024-08-08
Attending: PHYSICIAN ASSISTANT
Payer: COMMERCIAL

## 2024-08-08 VITALS
TEMPERATURE: 98.7 F | RESPIRATION RATE: 16 BRPM | SYSTOLIC BLOOD PRESSURE: 110 MMHG | HEART RATE: 61 BPM | OXYGEN SATURATION: 97 % | DIASTOLIC BLOOD PRESSURE: 56 MMHG | WEIGHT: 202.7 LBS | BODY MASS INDEX: 30.54 KG/M2

## 2024-08-08 DIAGNOSIS — D50.0 IRON DEFICIENCY ANEMIA DUE TO CHRONIC BLOOD LOSS: Primary | ICD-10-CM

## 2024-08-08 PROCEDURE — 99207 PR NO CHARGE LOS: CPT

## 2024-08-08 PROCEDURE — 250N000011 HC RX IP 250 OP 636: Performed by: PHYSICIAN ASSISTANT

## 2024-08-08 PROCEDURE — 250N000013 HC RX MED GY IP 250 OP 250 PS 637: Performed by: NURSE PRACTITIONER

## 2024-08-08 PROCEDURE — 96365 THER/PROPH/DIAG IV INF INIT: CPT

## 2024-08-08 PROCEDURE — 258N000003 HC RX IP 258 OP 636: Performed by: PHYSICIAN ASSISTANT

## 2024-08-08 RX ORDER — EPINEPHRINE 1 MG/ML
0.3 INJECTION, SOLUTION INTRAMUSCULAR; SUBCUTANEOUS EVERY 5 MIN PRN
Status: CANCELLED | OUTPATIENT
Start: 2024-08-15

## 2024-08-08 RX ORDER — ALBUTEROL SULFATE 0.83 MG/ML
2.5 SOLUTION RESPIRATORY (INHALATION)
Status: CANCELLED | OUTPATIENT
Start: 2024-08-15

## 2024-08-08 RX ORDER — DIPHENHYDRAMINE HYDROCHLORIDE 50 MG/ML
50 INJECTION INTRAMUSCULAR; INTRAVENOUS
Status: CANCELLED
Start: 2024-08-15

## 2024-08-08 RX ORDER — HEPARIN SODIUM,PORCINE 10 UNIT/ML
5-20 VIAL (ML) INTRAVENOUS DAILY PRN
Status: CANCELLED | OUTPATIENT
Start: 2024-08-15

## 2024-08-08 RX ORDER — HEPARIN SODIUM (PORCINE) LOCK FLUSH IV SOLN 100 UNIT/ML 100 UNIT/ML
5 SOLUTION INTRAVENOUS
Status: CANCELLED | OUTPATIENT
Start: 2024-08-15

## 2024-08-08 RX ORDER — MEPERIDINE HYDROCHLORIDE 25 MG/ML
25 INJECTION INTRAMUSCULAR; INTRAVENOUS; SUBCUTANEOUS EVERY 30 MIN PRN
Status: CANCELLED | OUTPATIENT
Start: 2024-08-15

## 2024-08-08 RX ORDER — DIPHENHYDRAMINE HCL 25 MG
50 CAPSULE ORAL ONCE
Status: COMPLETED | OUTPATIENT
Start: 2024-08-08 | End: 2024-08-08

## 2024-08-08 RX ORDER — ALBUTEROL SULFATE 90 UG/1
1-2 AEROSOL, METERED RESPIRATORY (INHALATION)
Status: CANCELLED
Start: 2024-08-15

## 2024-08-08 RX ORDER — METHYLPREDNISOLONE SODIUM SUCCINATE 125 MG/2ML
125 INJECTION, POWDER, LYOPHILIZED, FOR SOLUTION INTRAMUSCULAR; INTRAVENOUS
Status: CANCELLED
Start: 2024-08-15

## 2024-08-08 RX ADMIN — DIPHENHYDRAMINE HYDROCHLORIDE 50 MG: 25 CAPSULE ORAL at 12:23

## 2024-08-08 RX ADMIN — SODIUM CHLORIDE 250 ML: 9 INJECTION, SOLUTION INTRAVENOUS at 11:06

## 2024-08-08 RX ADMIN — FERRIC CARBOXYMALTOSE INJECTION 750 MG: 50 INJECTION, SOLUTION INTRAVENOUS at 11:08

## 2024-08-08 ASSESSMENT — PAIN SCALES - GENERAL: PAINLEVEL: NO PAIN (0)

## 2024-08-08 NOTE — PROGRESS NOTES
"Infusion Nursing Note:  Khadijah Trimble presents today for Injectafer 1/2.    Patient seen by provider today: No   present during visit today: Not Applicable.    Note: This is the pts first time to [facility]. Orientation provided to infusion center, pt also instructed on how and when to use her call light. Educational handout on Injectafer given to the patient. Questions answered to pt satisfaction.      The patient called out with 3 minutes left of her observation with slight back discomfort located in the right upper portion of her back. She describes it as a \"nudge/discomfort.\" Slight red, circular area noted to the corner of her right upper lip. She denies any itching. Skin check done for other red areas/hives and nothing seen. The patient denies any chest pain, SOB, or itching anywhere. VSS. See flowsheet for details. The patient was observed for an additional 15 minutes with no change in her symptoms. She continue to report the discomfort is not painful but more of just a nudge feeling in her back. Red area to upper lip is unchanged. Nikkie Smith NP updated and stated to give the patient 50 mg PO Benadryl and it is okay for her to leave. She also stated to updated the prescribing provider to see if the patient should be premedicated before her next dose of Injectafer. Writer also reviewed signs/symptoms of an allergic reaction and directed her seek medical care of immediately.     Patient updated on plan of care, verbalized understanding, and agreeable to plan.     Inbasket sent to GERALD Santana updating her on the above and to see if premeds should be added.     Intravenous Access:  Peripheral IV placed.    Treatment Conditions:  Not Applicable.    Post Infusion Assessment:  Patient tolerated infusion without incident.  Patient observed for a total of 45 minutes post Injectafer.  Site patent and intact, free from redness, edema or discomfort.  No evidence of " extravasations.  Access discontinued per protocol.       Discharge Plan:   AVS to patient via MYCHART.  Patient will return 8/15/24 for next appointment. Future appts have been reviewed and crosschecked with appt note and plan.   Patient discharged in stable condition accompanied by: self.  Departure Mode: Ambulatory.      Sheryl Miles RN

## 2024-08-08 NOTE — PROGRESS NOTES
"Patient had 3 min left of observation and stated her IV was \"bothering\" her.  At the same time, she mentions pain L mid back.  \"Maybe because of the way I was sitting.\"            "

## 2024-08-12 ENCOUNTER — OFFICE VISIT (OUTPATIENT)
Dept: FAMILY MEDICINE | Facility: CLINIC | Age: 34
End: 2024-08-12
Payer: COMMERCIAL

## 2024-08-12 VITALS
HEIGHT: 68 IN | RESPIRATION RATE: 12 BRPM | OXYGEN SATURATION: 99 % | BODY MASS INDEX: 30.8 KG/M2 | HEART RATE: 79 BPM | SYSTOLIC BLOOD PRESSURE: 113 MMHG | WEIGHT: 203.2 LBS | DIASTOLIC BLOOD PRESSURE: 69 MMHG | TEMPERATURE: 98.6 F

## 2024-08-12 DIAGNOSIS — Z01.818 PREOP GENERAL PHYSICAL EXAM: Primary | ICD-10-CM

## 2024-08-12 DIAGNOSIS — D50.0 IRON DEFICIENCY ANEMIA DUE TO CHRONIC BLOOD LOSS: ICD-10-CM

## 2024-08-12 DIAGNOSIS — Z30.2 ENCOUNTER FOR STERILIZATION: ICD-10-CM

## 2024-08-12 PROBLEM — Z36.89 ENCOUNTER FOR TRIAGE IN PREGNANT PATIENT: Status: RESOLVED | Noted: 2023-05-06 | Resolved: 2024-08-12

## 2024-08-12 PROBLEM — Z34.90 ENCOUNTER FOR ELECTIVE INDUCTION OF LABOR: Status: RESOLVED | Noted: 2023-08-24 | Resolved: 2024-08-12

## 2024-08-12 PROBLEM — O99.019 ANEMIA COMPLICATING PREGNANCY: Status: RESOLVED | Noted: 2023-06-30 | Resolved: 2024-08-12

## 2024-08-12 PROBLEM — O20.9 BLEEDING IN EARLY PREGNANCY: Status: RESOLVED | Noted: 2022-12-28 | Resolved: 2024-08-12

## 2024-08-12 LAB
ERYTHROCYTE [DISTWIDTH] IN BLOOD BY AUTOMATED COUNT: 21.1 % (ref 10–15)
FERRITIN SERPL-MCNC: 514 NG/ML (ref 6–175)
HCT VFR BLD AUTO: 33.7 % (ref 35–47)
HGB BLD-MCNC: 10.2 G/DL (ref 11.7–15.7)
IRON BINDING CAPACITY (ROCHE): 366 UG/DL (ref 240–430)
IRON SATN MFR SERPL: 72 % (ref 15–46)
IRON SERPL-MCNC: 265 UG/DL (ref 37–145)
MCH RBC QN AUTO: 23.3 PG (ref 26.5–33)
MCHC RBC AUTO-ENTMCNC: 30.3 G/DL (ref 31.5–36.5)
MCV RBC AUTO: 77 FL (ref 78–100)
PLATELET # BLD AUTO: 333 10E3/UL (ref 150–450)
RBC # BLD AUTO: 4.38 10E6/UL (ref 3.8–5.2)
WBC # BLD AUTO: 4.1 10E3/UL (ref 4–11)

## 2024-08-12 PROCEDURE — 85027 COMPLETE CBC AUTOMATED: CPT | Performed by: PHYSICIAN ASSISTANT

## 2024-08-12 PROCEDURE — 99214 OFFICE O/P EST MOD 30 MIN: CPT | Performed by: PHYSICIAN ASSISTANT

## 2024-08-12 PROCEDURE — 36415 COLL VENOUS BLD VENIPUNCTURE: CPT | Performed by: PHYSICIAN ASSISTANT

## 2024-08-12 PROCEDURE — 83550 IRON BINDING TEST: CPT | Performed by: PHYSICIAN ASSISTANT

## 2024-08-12 PROCEDURE — 83540 ASSAY OF IRON: CPT | Performed by: PHYSICIAN ASSISTANT

## 2024-08-12 PROCEDURE — 82728 ASSAY OF FERRITIN: CPT | Performed by: PHYSICIAN ASSISTANT

## 2024-08-12 RX ORDER — METHYLPREDNISOLONE SOD SUCC 125 MG
125 VIAL (EA) INJECTION ONCE
Status: CANCELLED
Start: 2024-08-12

## 2024-08-12 RX ORDER — DIPHENHYDRAMINE HYDROCHLORIDE 50 MG/ML
50 INJECTION INTRAMUSCULAR; INTRAVENOUS ONCE
Status: CANCELLED
Start: 2024-08-12

## 2024-08-12 NOTE — PROGRESS NOTES
Preoperative Evaluation  [facility]  [address]  [place] [address]  Phone: [phone]  Primary Provider: Gabriela Pérez PA-C  Pre-op Performing Provider: Gabriela Pérez PA-C  Aug 12, 2024             2024   Surgical Information   What procedure is being done? laparoscopic bilateral tubal ligation   Facility or Hospital where procedure/surgery will be performed: [facility]   Who is doing the procedure / surgery? senthil de leon   Date of surgery / procedure:    Time of surgery / procedure: n/a   Where do you plan to recover after surgery? at home with family        Fax number for surgical facility: [phone]    Assessment & Plan     The proposed surgical procedure is considered INTERMEDIATE risk.    Preop general physical exam  Encounter for sterilization    Iron deficiency anemia due to chronic blood loss  Rechecking labs today. Hgb 10.2 and increasing following significant bleeding after an  2024.   - CBC with platelets; Future  - Iron and iron binding capacity; Future  - Ferritin; Future              - No identified additional risk factors other than previously addressed    Antiplatelet or Anticoagulation Medication Instructions   - Patient is on no antiplatelet or anticoagulation medications.    Additional Medication Instructions  Patient is on no additional chronic medications    Recommendation  Approval given to proceed with proposed procedure, without further diagnostic evaluation.    Corinne Lucio is a 34 year old, presenting for the following:  Pre-Op Exam          2024    10:46 AM   Additional Questions   Roomed by jose m STONE related to upcoming procedure: Patient not responding well to birth control products, not wanting more children.         2024   Pre-Op Questionnaire   Have you ever had a heart attack or stroke? No   Have you ever had surgery on your heart or blood vessels, such as a stent  placement, a coronary artery bypass, or surgery on an artery in your head, neck, heart, or legs? No   Do you have chest pain with activity? No   Do you have a history of heart failure? No   Do you currently have a cold, bronchitis or symptoms of other infection? No   Do you have a cough, shortness of breath, or wheezing? No   Do you or anyone in your family have previous history of blood clots? (!) YES prolonged bleeding after  2024   Do you or does anyone in your family have a serious bleeding problem such as prolonged bleeding following surgeries or cuts? No   Have you ever had problems with anemia or been told to take iron pills? (!) YES iron for anemia 2024   Have you had any abnormal blood loss such as black, tarry or bloody stools, or abnormal vaginal bleeding? No   Have you ever had a blood transfusion? (!) YES    Have you ever had a transfusion reaction? No   Are you willing to have a blood transfusion if it is medically needed before, during, or after your surgery? Yes   Have you or any of your relatives ever had problems with anesthesia? No   Do you have sleep apnea, excessive snoring or daytime drowsiness? No   Do you have any artifical heart valves or other implanted medical devices like a pacemaker, defibrillator, or continuous glucose monitor? No   Do you have artificial joints? No   Are you allergic to latex? No        Health Care Directive  Patient does not have a Health Care Directive or Living Will: Discussed advance care planning with patient; information given to patient to review.    Preoperative Review of    reviewed - no record of controlled substances prescribed.          Patient Active Problem List    Diagnosis Date Noted    Iron deficiency anemia due to chronic blood loss 2024     Priority: Medium      Past Medical History:   Diagnosis Date    History of blood transfusion     Not sure when     Past Surgical History:   Procedure Laterality Date    BIOPSY       "Recebt test for HPV     No current outpatient medications on file.       Allergies   Allergen Reactions    Bee Venom Anaphylaxis        Social History     Tobacco Use    Smoking status: Never    Smokeless tobacco: Never   Substance Use Topics    Alcohol use: Yes       History   Drug Use Unknown             Review of Systems  Constitutional, HEENT, cardiovascular, pulmonary, gi and gu systems are negative, except as otherwise noted.    Objective    /69 (BP Location: Right arm, Patient Position: Sitting, Cuff Size: Adult Regular)   Pulse 79   Temp 98.6  F (37  C) (Oral)   Resp 12   Ht 1.734 m (5' 8.25\")   Wt 92.2 kg (203 lb 3.2 oz)   LMP 08/05/2024 (Within Days)   SpO2 99%   Breastfeeding No   BMI 30.67 kg/m     Estimated body mass index is 30.67 kg/m  as calculated from the following:    Height as of this encounter: 1.734 m (5' 8.25\").    Weight as of this encounter: 92.2 kg (203 lb 3.2 oz).  Physical Exam  GENERAL: alert and no distress  EYES: Eyes grossly normal to inspection, PERRL and conjunctivae and sclerae normal  HENT: ear canals and TM's normal, nose and mouth without ulcers or lesions  NECK: no adenopathy, no asymmetry, masses, or scars  RESP: lungs clear to auscultation - no rales, rhonchi or wheezes  CV: regular rate and rhythm, normal S1 S2, no S3 or S4, no murmur, click or rub, no peripheral edema  ABDOMEN: soft, nontender, no hepatosplenomegaly, no masses and bowel sounds normal  MS: no gross musculoskeletal defects noted, no edema  SKIN: no suspicious lesions or rashes  NEURO: Normal strength and tone, mentation intact and speech normal  PSYCH: mentation appears normal, affect normal/bright    Recent Labs   Lab Test 06/24/24  1446 08/25/23  0730   HGB 9.0* 7.8*     --         Diagnostics  Recent Results (from the past 24 hour(s))   CBC with platelets    Collection Time: 08/12/24 11:17 AM   Result Value Ref Range    WBC Count 4.1 4.0 - 11.0 10e3/uL    RBC Count 4.38 3.80 - 5.20 " 10e6/uL    Hemoglobin 10.2 (L) 11.7 - 15.7 g/dL    Hematocrit 33.7 (L) 35.0 - 47.0 %    MCV 77 (L) 78 - 100 fL    MCH 23.3 (L) 26.5 - 33.0 pg    MCHC 30.3 (L) 31.5 - 36.5 g/dL    RDW 21.1 (H) 10.0 - 15.0 %    Platelet Count 333 150 - 450 10e3/uL   Iron and iron binding capacity    Collection Time: 08/12/24 11:17 AM   Result Value Ref Range    Iron 265 (H) 37 - 145 ug/dL    Iron Binding Capacity 366 240 - 430 ug/dL    Iron Sat Index 72 (H) 15 - 46 %   Ferritin    Collection Time: 08/12/24 11:17 AM   Result Value Ref Range    Ferritin 514 (H) 6 - 175 ng/mL      No EKG required, no history of coronary heart disease, significant arrhythmia, peripheral arterial disease or other structural heart disease.    Revised Cardiac Risk Index (RCRI)  The patient has the following serious cardiovascular risks for perioperative complications:   - No serious cardiac risks = 0 points     RCRI Interpretation: 0 points: Class I (very low risk - 0.4% complication rate)         Signed Electronically by: Gabriela Pérez PA-C  A copy of this evaluation report is provided to the requesting physician.

## 2024-08-12 NOTE — PATIENT INSTRUCTIONS
Patient Education   Preparing for Your Surgery  Getting started  A nurse will call you to review your health history and instructions. They will give you an arrival time based on your scheduled surgery time. Please be ready to share:  Your doctor's clinic name and phone number  Your medical, surgical, and anesthesia history  A list of allergies and sensitivities  A list of medicines, including herbal treatments and over-the-counter drugs  Whether the patient has a legal guardian (ask how to send us the papers in advance)  Please tell us if you're pregnant--or if there's any chance you might be pregnant. Some surgeries may injure a fetus (unborn baby), so they require a pregnancy test. Surgeries that are safe for a fetus don't always need a test, and you can choose whether to have one.   If you have a child who's having surgery, please ask for a copy of Preparing for Your Child's Surgery.    Preparing for surgery  Within 10 to 30 days of surgery: Have a pre-op exam (sometimes called an H&P, or History and Physical). This can be done at a clinic or pre-operative center.  If you're having a , you may not need this exam. Talk to your care team.  At your pre-op exam, talk to your care team about all medicines you take. If you need to stop any medicines before surgery, ask when to start taking them again.  We do this for your safety. Many medicines can make you bleed too much during surgery. Some change how well surgery (anesthesia) drugs work.  Call your insurance company to let them know you're having surgery. (If you don't have insurance, call [phone].)  Call your clinic if there's any change in your health. This includes signs of a cold or flu (sore throat, runny nose, cough, rash, fever). It also includes a scrape or scratch near the surgery site.  If you have questions on the day of surgery, call your hospital or surgery center.  Eating and drinking guidelines  For your safety: Unless your surgeon  tells you otherwise, follow the guidelines below.  Eat and drink as usual until 8 hours before you arrive for surgery. After that, no food or milk.  Drink clear liquids until 2 hours before you arrive. These are liquids you can see through, like water, Gatorade, and Propel Water. They also include plain black coffee and tea (no cream or milk), candy, and breath mints. You can spit out gum when you arrive.  If you drink alcohol: Stop drinking it the night before surgery.  If your care team tells you to take medicine on the morning of surgery, it's okay to take it with a sip of water.  Preventing infection  Shower or bathe the night before and morning of your surgery. Follow the instructions your clinic gave you. (If no instructions, use regular soap.)  Don't shave or clip hair near your surgery site. We'll remove the hair if needed.  Don't smoke or vape the morning of surgery. You may chew nicotine gum up to 2 hours before surgery. A nicotine patch is okay.  Note: Some surgeries require you to completely quit smoking and nicotine. Check with your surgeon.  Your care team will make every effort to keep you safe from infection. We will:  Clean our hands often with soap and water (or an alcohol-based hand rub).  Clean the skin at your surgery site with a special soap that kills germs.  Give you a special gown to keep you warm. (Cold raises the risk of infection.)  Wear special hair covers, masks, gowns and gloves during surgery.  Give antibiotic medicine, if prescribed. Not all surgeries need antibiotics.  What to bring on the day of surgery  Photo ID and insurance card  Copy of your health care directive, if you have one  Glasses and hearing aids (bring cases)  You can't wear contacts during surgery  Inhaler and eye drops, if you use them (tell us about these when you arrive)  CPAP machine or breathing device, if you use them  A few personal items, if spending the night  If you have . . .  A pacemaker, ICD (cardiac  defibrillator) or other implant: Bring the ID card.  An implanted stimulator: Bring the remote control.  A legal guardian: Bring a copy of the certified (court-stamped) guardianship papers.  Please remove any jewelry, including body piercings. Leave jewelry and other valuables at home.  If you're going home the day of surgery  You must have a responsible adult drive you home. They should stay with you overnight as well.  If you don't have someone to stay with you, and you aren't safe to go home alone, we may keep you overnight. Insurance often won't pay for this.  After surgery  If it's hard to control your pain or you need more pain medicine, please call your surgeon's office.  Questions?   If you have any questions for your care team, list them here: _________________________________________________________________________________________________________________________________________________________________________ ____________________________________ ____________________________________ ____________________________________  For informational purposes only. Not to replace the advice of your health care provider. Copyright   2003, 2019 [facility]. All rights reserved. Clinically reviewed by Marcela Valdez MD. SMARTworks [id number] - REV 12/22.

## 2024-08-15 ENCOUNTER — INFUSION THERAPY VISIT (OUTPATIENT)
Dept: INFUSION THERAPY | Facility: CLINIC | Age: 34
End: 2024-08-15
Attending: PHYSICIAN ASSISTANT
Payer: COMMERCIAL

## 2024-08-15 VITALS
TEMPERATURE: 98.1 F | DIASTOLIC BLOOD PRESSURE: 72 MMHG | WEIGHT: 204.9 LBS | HEART RATE: 94 BPM | RESPIRATION RATE: 16 BRPM | OXYGEN SATURATION: 97 % | SYSTOLIC BLOOD PRESSURE: 113 MMHG | BODY MASS INDEX: 30.93 KG/M2

## 2024-08-15 DIAGNOSIS — D50.0 IRON DEFICIENCY ANEMIA DUE TO CHRONIC BLOOD LOSS: Primary | ICD-10-CM

## 2024-08-15 PROCEDURE — 258N000003 HC RX IP 258 OP 636: Performed by: PHYSICIAN ASSISTANT

## 2024-08-15 PROCEDURE — 96365 THER/PROPH/DIAG IV INF INIT: CPT

## 2024-08-15 PROCEDURE — 99207 PR NO CHARGE LOS: CPT

## 2024-08-15 PROCEDURE — 250N000011 HC RX IP 250 OP 636: Performed by: PHYSICIAN ASSISTANT

## 2024-08-15 PROCEDURE — 96375 TX/PRO/DX INJ NEW DRUG ADDON: CPT

## 2024-08-15 RX ORDER — DIPHENHYDRAMINE HYDROCHLORIDE 50 MG/ML
50 INJECTION INTRAMUSCULAR; INTRAVENOUS ONCE
Status: CANCELLED
Start: 2024-08-15 | End: 2024-08-15

## 2024-08-15 RX ORDER — ALBUTEROL SULFATE 0.83 MG/ML
2.5 SOLUTION RESPIRATORY (INHALATION)
OUTPATIENT
Start: 2024-08-15

## 2024-08-15 RX ORDER — METHYLPREDNISOLONE SOD SUCC 125 MG
125 VIAL (EA) INJECTION ONCE
Status: COMPLETED | OUTPATIENT
Start: 2024-08-15 | End: 2024-08-15

## 2024-08-15 RX ORDER — DIPHENHYDRAMINE HYDROCHLORIDE 50 MG/ML
50 INJECTION INTRAMUSCULAR; INTRAVENOUS
Start: 2024-08-15

## 2024-08-15 RX ORDER — METHYLPREDNISOLONE SOD SUCC 125 MG
125 VIAL (EA) INJECTION ONCE
Status: CANCELLED
Start: 2024-08-15 | End: 2024-08-15

## 2024-08-15 RX ORDER — HEPARIN SODIUM (PORCINE) LOCK FLUSH IV SOLN 100 UNIT/ML 100 UNIT/ML
5 SOLUTION INTRAVENOUS
OUTPATIENT
Start: 2024-08-15

## 2024-08-15 RX ORDER — METHYLPREDNISOLONE SODIUM SUCCINATE 125 MG/2ML
125 INJECTION, POWDER, LYOPHILIZED, FOR SOLUTION INTRAMUSCULAR; INTRAVENOUS
Start: 2024-08-15

## 2024-08-15 RX ORDER — EPINEPHRINE 1 MG/ML
0.3 INJECTION, SOLUTION INTRAMUSCULAR; SUBCUTANEOUS EVERY 5 MIN PRN
OUTPATIENT
Start: 2024-08-15

## 2024-08-15 RX ORDER — MEPERIDINE HYDROCHLORIDE 25 MG/ML
25 INJECTION INTRAMUSCULAR; INTRAVENOUS; SUBCUTANEOUS EVERY 30 MIN PRN
OUTPATIENT
Start: 2024-08-15

## 2024-08-15 RX ORDER — ALBUTEROL SULFATE 90 UG/1
1-2 AEROSOL, METERED RESPIRATORY (INHALATION)
Start: 2024-08-15

## 2024-08-15 RX ORDER — HEPARIN SODIUM,PORCINE 10 UNIT/ML
5-20 VIAL (ML) INTRAVENOUS DAILY PRN
OUTPATIENT
Start: 2024-08-15

## 2024-08-15 RX ADMIN — SODIUM CHLORIDE 250 ML: 9 INJECTION, SOLUTION INTRAVENOUS at 11:46

## 2024-08-15 RX ADMIN — DIPHENHYDRAMINE HYDROCHLORIDE: 50 INJECTION, SOLUTION INTRAMUSCULAR; INTRAVENOUS at 11:52

## 2024-08-15 RX ADMIN — FERRIC CARBOXYMALTOSE INJECTION 750 MG: 50 INJECTION, SOLUTION INTRAVENOUS at 12:07

## 2024-08-15 RX ADMIN — METHYLPREDNISOLONE SODIUM SUCCINATE 125 MG: 125 INJECTION, POWDER, FOR SOLUTION INTRAMUSCULAR; INTRAVENOUS at 11:49

## 2024-08-15 ASSESSMENT — PAIN SCALES - GENERAL: PAINLEVEL: NO PAIN (0)

## 2024-08-15 NOTE — PROGRESS NOTES
"12:15 Patient called out to say she was having discomfort at PIV site. Good easy blood return noted. Ferrlecit rate slowed to 350mls/ hour and hot pack applied to her arm. Khadijah stated that \"felt better\".    Kaylan Guy RN    "

## 2024-08-15 NOTE — PROGRESS NOTES
Infusion Nursing Note:  Khadijah Trimble presents today for Injectafer 2/2.    Patient seen by provider today: No   present during visit today: Not Applicable.    Note: Pt states after her first infusion she also developed a red spot on her right wrist that lasted about 10 minutes. She will be getting IV solumedrol and benadryl prior to today's dose.      Intravenous Access:  Peripheral IV placed.    Treatment Conditions:  Not Applicable.      Post Infusion Assessment:  Patient tolerated infusion without incident. Pt tolerated 30 min OBS uneventfully.  Blood return noted pre and post infusion.  Site patent and intact, free from redness, edema or discomfort.  No evidence of extravasations.  Access discontinued per protocol.       Discharge Plan:   AVS to patient via MYCHART.  Patient has no further appts at this time. Future appts have been reviewed and crosschecked with appt note and plan.   Patient discharged in stable condition accompanied by: self.  Departure Mode: Ambulatory.      Gosia Giles RN

## 2024-09-05 ENCOUNTER — OFFICE VISIT (OUTPATIENT)
Dept: OBGYN | Facility: CLINIC | Age: 34
End: 2024-09-05
Payer: COMMERCIAL

## 2024-09-05 VITALS
BODY MASS INDEX: 30.43 KG/M2 | HEART RATE: 61 BPM | WEIGHT: 201.6 LBS | OXYGEN SATURATION: 100 % | SYSTOLIC BLOOD PRESSURE: 109 MMHG | DIASTOLIC BLOOD PRESSURE: 67 MMHG

## 2024-09-05 DIAGNOSIS — Z98.890 POSTOPERATIVE STATE: Primary | ICD-10-CM

## 2024-09-05 PROCEDURE — 99212 OFFICE O/P EST SF 10 MIN: CPT | Performed by: OBSTETRICS & GYNECOLOGY

## 2024-09-05 NOTE — PATIENT INSTRUCTIONS
If you have any questions regarding your visit, Please contact your care team.    To Schedule an Appointment 24/7  Call: [phone]  Women s Health  TELEPHONE NUMBER   Brent Diallo M.D.    Aysha-Medical Assistant    Justine Garcia-Surgery Scheduler  Carolann- Tuesday-[place]                   7:30 a.m.-3:30 p.m.  Wednesday-[place]             8:00 a.m.-4:30 p.m.  Thursday-[place]     8:00 a.m.-4:00 p.m.  Friday-[place]                       7:30 a.m.-1:00 p.m. [facility]  [address] [place] [address].  [place] [address] [phone] Phone  [phone] Fax    Imaging Scheduling all locations  [phone]      [facility]  [address]  [place], [place] [address] [phone]    [facility]  [address] [place] [id number] [phone] ask for Women's Clinic     **Surgeries** Our Surgery Schedulers will contact you to schedule. If you do not receive a call within 3 business days, please call [phone].    Urgent Care locations:  [facility] Monday-Friday                 10 am - 8 pm  Saturday and Sunday        9 am - 5 pm   [phone]   If you need a medication refill, please contact your pharmacy. Please allow 3 business days for your refill to be completed.  As always, Thank you for trusting us with your healthcare needs!  If you have any questions regarding your visit, Please contact your care team.    [employer] [facility]: [phone]    To Schedule an Appointment 24/7  Call: [phone]    see additional instructions from your care team below

## 2024-09-05 NOTE — PROGRESS NOTES
Khadijah is a 34 year old , She is 2 weeks s/p bilateral salpingectomy.  Her postop recovery has been uncomplicated.  She is currently requiring no medications for pain management.  No abnormal vaginal bleeding, no hot flashes. Energy level is normal.  Denies fever, chills.    The pathology report showed:   Final Diagnosis    Fallopian tubes, bilateral salpingectomy - Complete cross sections present       The medical history, social history and family history have been reviewed and updated as indicated.      ROS:  4 point ROS including Respiratory, CV, GI and , other than that noted in the HPI and the PMH, is negative     PE: /67 (BP Location: Right arm, Cuff Size: Adult Regular)   Pulse 61   Wt 91.4 kg (201 lb 9.6 oz)   LMP 2024 (Exact Date)   SpO2 100%   BMI 30.43 kg/m    HEENT:  NC/AT, EOMI  Abd: soft, non tender, no masses  Incisions: intact, no erythema, induration or discharge      ASSESSMENT:  Post op state    PLAN:  Return to routine care.    She has been informed that the tubal sterilization does not affect bleeding and with her history she may need to consider medical or surgical management.  She states she will observe at this time.    Questions seem to be answered.     Brent Diallo MD

## 2024-10-05 ENCOUNTER — HEALTH MAINTENANCE LETTER (OUTPATIENT)
Age: 34
End: 2024-10-05

## 2025-03-23 SDOH — HEALTH STABILITY: PHYSICAL HEALTH: ON AVERAGE, HOW MANY DAYS PER WEEK DO YOU ENGAGE IN MODERATE TO STRENUOUS EXERCISE (LIKE A BRISK WALK)?: 1 DAY

## 2025-03-23 SDOH — HEALTH STABILITY: PHYSICAL HEALTH: ON AVERAGE, HOW MANY MINUTES DO YOU ENGAGE IN EXERCISE AT THIS LEVEL?: 10 MIN

## 2025-03-23 ASSESSMENT — SOCIAL DETERMINANTS OF HEALTH (SDOH): HOW OFTEN DO YOU GET TOGETHER WITH FRIENDS OR RELATIVES?: ONCE A WEEK

## 2025-03-27 ENCOUNTER — OFFICE VISIT (OUTPATIENT)
Dept: FAMILY MEDICINE | Facility: CLINIC | Age: 35
End: 2025-03-27
Payer: COMMERCIAL

## 2025-03-27 VITALS
WEIGHT: 197.6 LBS | DIASTOLIC BLOOD PRESSURE: 75 MMHG | OXYGEN SATURATION: 99 % | BODY MASS INDEX: 29.95 KG/M2 | HEART RATE: 78 BPM | SYSTOLIC BLOOD PRESSURE: 119 MMHG | RESPIRATION RATE: 12 BRPM | TEMPERATURE: 97.6 F | HEIGHT: 68 IN

## 2025-03-27 DIAGNOSIS — Z00.00 ROUTINE GENERAL MEDICAL EXAMINATION AT A HEALTH CARE FACILITY: Primary | ICD-10-CM

## 2025-03-27 DIAGNOSIS — D50.0 IRON DEFICIENCY ANEMIA DUE TO CHRONIC BLOOD LOSS: ICD-10-CM

## 2025-03-27 DIAGNOSIS — Z12.4 SCREENING FOR CERVICAL CANCER: ICD-10-CM

## 2025-03-27 LAB
ERYTHROCYTE [DISTWIDTH] IN BLOOD BY AUTOMATED COUNT: 12.5 % (ref 10–15)
FERRITIN SERPL-MCNC: 100 NG/ML (ref 6–175)
HCT VFR BLD AUTO: 40.8 % (ref 35–47)
HGB BLD-MCNC: 12.8 G/DL (ref 11.7–15.7)
IRON BINDING CAPACITY (ROCHE): 266 UG/DL (ref 240–430)
IRON SATN MFR SERPL: 30 % (ref 15–46)
IRON SERPL-MCNC: 81 UG/DL (ref 37–145)
MCH RBC QN AUTO: 29 PG (ref 26.5–33)
MCHC RBC AUTO-ENTMCNC: 31.4 G/DL (ref 31.5–36.5)
MCV RBC AUTO: 93 FL (ref 78–100)
PLATELET # BLD AUTO: 290 10E3/UL (ref 150–450)
RBC # BLD AUTO: 4.41 10E6/UL (ref 3.8–5.2)
WBC # BLD AUTO: 4.6 10E3/UL (ref 4–11)

## 2025-03-27 RX ORDER — MULTIVITAMIN
1 TABLET ORAL DAILY
Qty: 90 TABLET | Refills: 3 | Status: SHIPPED | OUTPATIENT
Start: 2025-03-27

## 2025-03-27 NOTE — PATIENT INSTRUCTIONS
Patient Education   Preventive Care Advice   This is general advice given by our system to help you stay healthy. However, your care team may have specific advice just for you. Please talk to your care team about your preventive care needs.  Nutrition  Eat 5 or more servings of fruits and vegetables each day.  Try wheat bread, brown rice and whole grain pasta (instead of white bread, rice, and pasta).  Get enough calcium and vitamin D. Check the label on foods and aim for 100% of the RDA (recommended daily allowance).  Lifestyle  Exercise at least 150 minutes each week  (30 minutes a day, 5 days a week).  Do muscle strengthening activities 2 days a week. These help control your weight and prevent disease.  No smoking.  Wear sunscreen to prevent skin cancer.  Have a dental exam and cleaning every 6 months.  Yearly exams  See your health care team every year to talk about:  Any changes in your health.  Any medicines your care team has prescribed.  Preventive care, family planning, and ways to prevent chronic diseases.  Shots (vaccines)   HPV shots (up to age 26), if you've never had them before.  Hepatitis B shots (up to age 59), if you've never had them before.  COVID-19 shot: Get this shot when it's due.  Flu shot: Get a flu shot every year.  Tetanus shot: Get a tetanus shot every 10 years.  Pneumococcal, hepatitis A, and RSV shots: Ask your care team if you need these based on your risk.  Shingles shot (for age 50 and up)  General health tests  Diabetes screening:  Starting at age 35, Get screened for diabetes at least every 3 years.  If you are younger than age 35, ask your care team if you should be screened for diabetes.  Cholesterol test: At age 39, start having a cholesterol test every 5 years, or more often if advised.  Bone density scan (DEXA): At age 50, ask your care team if you should have this scan for osteoporosis (brittle bones).  Hepatitis C: Get tested at least once in your life.  STIs (sexually  transmitted infections)  Before age 24: Ask your care team if you should be screened for STIs.  After age 24: Get screened for STIs if you're at risk. You are at risk for STIs (including HIV) if:  You are sexually active with more than one person.  You don't use condoms every time.  You or a partner was diagnosed with a sexually transmitted infection.  If you are at risk for HIV, ask about PrEP medicine to prevent HIV.  Get tested for HIV at least once in your life, whether you are at risk for HIV or not.  Cancer screening tests  Cervical cancer screening: If you have a cervix, begin getting regular cervical cancer screening tests starting at age 21.  Breast cancer scan (mammogram): If you've ever had breasts, begin having regular mammograms starting at age 40. This is a scan to check for breast cancer.  Colon cancer screening: It is important to start screening for colon cancer at age 45.  Have a colonoscopy test every 10 years (or more often if you're at risk) Or, ask your provider about stool tests like a FIT test every year or Cologuard test every 3 years.  To learn more about your testing options, visit:   .  For help making a decision, visit:   https://bit.ly/gx39369.  Prostate cancer screening test: If you have a prostate, ask your care team if a prostate cancer screening test (PSA) at age 55 is right for you.  Lung cancer screening: If you are a current or former smoker ages 50 to 80, ask your care team if ongoing lung cancer screenings are right for you.  For informational purposes only. Not to replace the advice of your health care provider. Copyright   2023 [facility]. All rights reserved. Clinically reviewed by the [facility]. QuVIS 906138 - REV 01/24.  9 Ways to Cut Back on Drinking  Maybe you've found yourself drinking more alcohol than you'd prefer. If you want to cut back, here are some ideas to try.    Think before you drink.  Do you really want a drink,  "or is it just a habit? If you're used to having a drink at a certain time, try doing something else then.     Look for substitutes.  Find some no-alcohol drinks that you enjoy, like flavored seltzer water, tea with honey, or tonic with a slice of lime. Or try alcohol-free beer or \"virgin\" cocktails (without the alcohol).     Drink more water.  Use water to quench your thirst. Drink a glass of water before you have any alcohol. Have another glass along with every drink or between drinks.     Shrink your drink.  For example, have a bottle of beer instead of a pint. Use a smaller glass for wine. Choose drinks with lower alcohol content (ABV%). Or use less liquor and more mixer in cocktails.     Slow down.  It's easy to drink quickly and without thinking about it. Pay attention, and make each drink last longer.     Do the math.  Total up how much you spend on alcohol each month. How much is that a year? If you cut back, what could you do with the money you save?     Take a break.  Choose a day or two each week when you won't drink at all. Notice how you feel on those days, physically and emotionally. How did you sleep? Do you feel better? Over time, add more break days.     Count calories.  Would you like to lose some weight? For some people that's a good motivator for cutting back. Figure out how many calories are in each drink. How many does that add up to in a day? In a week? In a month?     Practice saying no.  Be ready when someone offers you a drink. Try: \"Thanks, I've had enough.\" Or \"Thanks, but I'm cutting back.\" Or \"No, thanks. I feel better when I drink less.\"   Current as of: August 20, 2024  Content Version: 14.4 2024-2025 [employer].   Care instructions adapted under license by your healthcare professional. If you have questions about a medical condition or this instruction, always ask your healthcare professional. [employer] disclaims any warranty or liability for your use of " this information.

## 2025-03-27 NOTE — PROGRESS NOTES
"Preventive Care Visit  [facility] WALDO Pérez PA-C, Family Medicine  Mar 27, 2025      Assessment & Plan     Routine general medical examination at a health care facility  Well adult.    Iron deficiency anemia due to chronic blood loss  Recheck. Not taking iron supplement.   - CBC with platelets; Future  - Iron and iron binding capacity; Future  - Ferritin; Future  - multivitamin (ONE-DAILY) tablet; Take 1 tablet by mouth daily.  - CBC with platelets  - Iron and iron binding capacity  - Ferritin    Screening for cervical cancer  Screen. + HPV 2023.  - HPV and Gynecologic Cytology Panel - Recommended Age 30-65 Years        The longitudinal plan of care for the diagnosis(es)/condition(s) as documented were addressed during this visit. Due to the added complexity in care, I will continue to support Khadijah in the subsequent management and with ongoing continuity of care.      BMI  Estimated body mass index is 29.81 kg/m  as calculated from the following:    Height as of this encounter: 1.734 m (5' 8.27\").    Weight as of this encounter: 89.6 kg (197 lb 9.6 oz).   Weight management plan: Discussed healthy diet and exercise guidelines    Counseling  Appropriate preventive services were addressed with this patient via screening, questionnaire, or discussion as appropriate for fall prevention, nutrition, physical activity, Tobacco-use cessation, social engagement, weight loss and cognition.  Checklist reviewing preventive services available has been given to the patient.  Reviewed patient's diet, addressing concerns and/or questions.   She is at risk for lack of exercise and has been provided with information to increase physical activity for the benefit of her well-being.   The patient was instructed to see the dentist every 6 months.   The patient reports drinking more than 3 alcoholic drinks per day and/or more than 7 drhnks per week. The patient was counseled and given information about " possible harmful effects of excessive alcohol intake.        Corinne Lucio is a 34 year old, presenting for the following:  Physical (Pap sear and HPV)        3/27/2025     7:59 AM   Additional Questions   Roomed by An V.         3/27/2025     7:59 AM   Patient Reported Additional Medications   Patient reports taking the following new medications none          HPI        [facility]  10/24/2023 +HPV 18/45, then had colposcopy @ [facility]    Not taking any supplements. Not on iron pills.            Advance Care Planning  Patient does not have a Health Care Directive: Discussed advance care planning with patient; information given to patient to review.      3/23/2025   General Health   How would you rate your overall physical health? Good   Feel stress (tense, anxious, or unable to sleep) Only a little   (!) STRESS CONCERN      3/23/2025   Nutrition   Three or more servings of calcium each day? (!) NO   Diet: Regular (no restrictions)   How many servings of fruit and vegetables per day? (!) 0-1   How many sweetened beverages each day? (!) 3         3/23/2025   Exercise   Days per week of moderate/strenous exercise 1 day   Average minutes spent exercising at this level 10 min   (!) EXERCISE CONCERN      3/23/2025   Social Factors   Frequency of gathering with friends or relatives Once a week   Worry food won't last until get money to buy more No   Food not last or not have enough money for food? No   Do you have housing? (Housing is defined as stable permanent housing and does not include staying ouside in a car, in a tent, in an abandoned building, in an overnight shelter, or couch-surfing.) Yes   Are you worried about losing your housing? No   Lack of transportation? No   Unable to get utilities (heat,electricity)? No         3/23/2025   Dental   Dentist two times every year? (!) NO           4/23/2024   TB Screening   Were you born outside of the [place]? No           Today's  PHQ-2 Score:       3/26/2025    11:50 AM   PHQ-2 ( 1999 [employer])   Q1: Little interest or pleasure in doing things 0   Q2: Feeling down, depressed or hopeless 0   PHQ-2 Score 0    Q1: Little interest or pleasure in doing things Not at all   Q2: Feeling down, depressed or hopeless Not at all   PHQ-2 Score 0       Patient-reported           3/23/2025   Substance Use   Alcohol more than 3/day or more than 7/wk Yes   How often do you have a drink containing alcohol 2 to 3 times a week   How many alcohol drinks on typical day 3 or 4   How often do you have 5+ drinks at one occasion Monthly   Audit 2/3 Score 3   How often not able to stop drinking once started Never   How often failed to do what normally expected Never   How often needed first drink in am after a heavy drinking session Never   How often feeling of guilt or remorse after drinking Never   How often unable to remember what happened the night before Never   Have you or someone else been injured because of your drinking No   Has anyone been concerned or suggested you cut down on drinking No   TOTAL SCORE - AUDIT 6   Do you use any other substances recreationally? No     Social History     Tobacco Use    Smoking status: Never    Smokeless tobacco: Never   Vaping Use    Vaping status: Never Used   Substance Use Topics    Alcohol use: Yes    Drug use: Never                  3/23/2025   STI Screening   New sexual partner(s) since last STI/HIV test? No     History of abnormal Pap smear: No - age 30-64 HPV with reflex Pap every 5 years recommended             3/23/2025   Contraception/Family Planning   Questions about contraception or family planning No        Reviewed and updated as needed this visit by Provider                          Review of Systems  Constitutional, HEENT, cardiovascular, pulmonary, gi and gu systems are negative, except as otherwise noted.     Objective    Exam  /75   Pulse 78   Temp 97.6  F (36.4  C) (Temporal)   Resp 12   Ht 1.734  "m (5' 8.27\")   Wt 89.6 kg (197 lb 9.6 oz)   LMP 03/16/2025 (Exact Date)   SpO2 99%   BMI 29.81 kg/m     Estimated body mass index is 29.81 kg/m  as calculated from the following:    Height as of this encounter: 1.734 m (5' 8.27\").    Weight as of this encounter: 89.6 kg (197 lb 9.6 oz).    Physical Exam  GENERAL: alert and no distress  EYES: Eyes grossly normal to inspection, PERRL and conjunctivae and sclerae normal  HENT: ear canals and TM's normal, nose and mouth without ulcers or lesions  NECK: no adenopathy, no asymmetry, masses, or scars  RESP: lungs clear to auscultation - no rales, rhonchi or wheezes  CV: regular rate and rhythm, normal S1 S2, no S3 or S4, no murmur, click or rub, no peripheral edema  ABDOMEN: soft, nontender, no hepatosplenomegaly, no masses and bowel sounds normal   (female) w/bimanual: normal female external genitalia, normal urethral meatus, normal vaginal mucosa, and normal cervix/adnexa/uterus without masses or discharge  MS: no gross musculoskeletal defects noted, no edema  SKIN: no suspicious lesions or rashes  NEURO: Normal strength and tone, mentation intact and speech normal  PSYCH: mentation appears normal, affect normal/bright        Signed Electronically by: Gabriela Pérez PA-C    "

## 2025-03-29 LAB
HPV HR 12 DNA CVX QL NAA+PROBE: NEGATIVE
HPV16 DNA CVX QL NAA+PROBE: NEGATIVE
HPV18 DNA CVX QL NAA+PROBE: NEGATIVE
HUMAN PAPILLOMA VIRUS FINAL DIAGNOSIS: NORMAL

## 2025-04-01 LAB
BKR AP ASSOCIATED HPV REPORT: NORMAL
BKR LAB AP GYN ADEQUACY: NORMAL
BKR LAB AP GYN INTERPRETATION: NORMAL
BKR LAB AP LMP: NORMAL
BKR LAB AP PREVIOUS ABNL DX: NORMAL
BKR LAB AP PREVIOUS ABNORMAL: NORMAL
PATH REPORT.COMMENTS IMP SPEC: NORMAL
PATH REPORT.COMMENTS IMP SPEC: NORMAL
PATH REPORT.RELEVANT HX SPEC: NORMAL

## 2025-04-02 PROBLEM — R87.810 CERVICAL HIGH RISK HPV (HUMAN PAPILLOMAVIRUS) TEST POSITIVE: Status: ACTIVE | Noted: 2025-04-02

## 2025-04-07 ENCOUNTER — PATIENT OUTREACH (OUTPATIENT)
Dept: FAMILY MEDICINE | Facility: CLINIC | Age: 35
End: 2025-04-07
Payer: COMMERCIAL